# Patient Record
Sex: MALE | Race: BLACK OR AFRICAN AMERICAN | NOT HISPANIC OR LATINO | Employment: FULL TIME | URBAN - METROPOLITAN AREA
[De-identification: names, ages, dates, MRNs, and addresses within clinical notes are randomized per-mention and may not be internally consistent; named-entity substitution may affect disease eponyms.]

---

## 2023-03-01 LAB — PROSTATE SPECIFIC AG (NG/ML) IN SER/PLAS: 0.15 NG/ML (ref 0–4)

## 2023-03-13 DIAGNOSIS — I26.99 PULMONARY EMBOLISM, UNSPECIFIED CHRONICITY, UNSPECIFIED PULMONARY EMBOLISM TYPE, UNSPECIFIED WHETHER ACUTE COR PULMONALE PRESENT (MULTI): ICD-10-CM

## 2023-03-15 RX ORDER — RIVAROXABAN 10 MG/1
TABLET, FILM COATED ORAL
Qty: 90 TABLET | Refills: 3 | Status: SHIPPED
Start: 2023-03-15 | End: 2023-04-03 | Stop reason: SDUPTHER

## 2023-04-03 DIAGNOSIS — I26.99 PULMONARY EMBOLISM, UNSPECIFIED CHRONICITY, UNSPECIFIED PULMONARY EMBOLISM TYPE, UNSPECIFIED WHETHER ACUTE COR PULMONALE PRESENT (MULTI): ICD-10-CM

## 2023-04-07 DIAGNOSIS — E78.2 COMBINED HYPERLIPIDEMIA: ICD-10-CM

## 2023-04-07 DIAGNOSIS — M10.9 ACUTE GOUT, UNSPECIFIED CAUSE, UNSPECIFIED SITE: ICD-10-CM

## 2023-04-07 DIAGNOSIS — E03.9 HYPOTHYROIDISM, UNSPECIFIED TYPE: ICD-10-CM

## 2023-04-07 PROBLEM — H04.123 BILATERAL DRY EYES: Status: ACTIVE | Noted: 2023-04-07

## 2023-04-07 PROBLEM — E29.1 HYPOGONADISM MALE: Status: ACTIVE | Noted: 2023-04-07

## 2023-04-07 PROBLEM — R41.3 MEMORY CHANGE: Status: ACTIVE | Noted: 2023-04-07

## 2023-04-07 PROBLEM — R47.89 WORD FINDING DIFFICULTY: Status: ACTIVE | Noted: 2023-04-07

## 2023-04-07 PROBLEM — M54.50 LOW BACK PAIN: Status: ACTIVE | Noted: 2023-04-07

## 2023-04-07 PROBLEM — N39.0 UTI (URINARY TRACT INFECTION): Status: ACTIVE | Noted: 2023-04-07

## 2023-04-07 PROBLEM — F40.240 CLAUSTROPHOBIA: Status: ACTIVE | Noted: 2023-04-07

## 2023-04-07 PROBLEM — I26.99 PULMONARY EMBOLISM (MULTI): Status: ACTIVE | Noted: 2023-04-07

## 2023-04-07 PROBLEM — H25.13 NUCLEAR SCLEROSIS OF BOTH EYES: Status: ACTIVE | Noted: 2023-04-07

## 2023-04-07 PROBLEM — M51.26 LUMBAR HERNIATED DISC: Status: ACTIVE | Noted: 2023-04-07

## 2023-04-07 PROBLEM — R05.9 COUGH: Status: ACTIVE | Noted: 2023-04-07

## 2023-04-07 PROBLEM — M54.16 LEFT LUMBAR RADICULOPATHY: Status: ACTIVE | Noted: 2023-04-07

## 2023-04-07 PROBLEM — H40.003 GLAUCOMA SUSPECT OF BOTH EYES: Status: ACTIVE | Noted: 2023-04-07

## 2023-04-07 PROBLEM — M54.32 LEFT SIDED SCIATICA: Status: ACTIVE | Noted: 2023-04-07

## 2023-04-07 PROBLEM — M79.89 LEG SWELLING: Status: ACTIVE | Noted: 2023-04-07

## 2023-04-07 PROBLEM — R53.81 MALAISE: Status: ACTIVE | Noted: 2023-04-07

## 2023-04-07 PROBLEM — C61 ADENOCARCINOMA OF PROSTATE (MULTI): Status: ACTIVE | Noted: 2023-04-07

## 2023-04-07 PROBLEM — R39.9 LOWER URINARY TRACT SYMPTOMS (LUTS): Status: ACTIVE | Noted: 2023-04-07

## 2023-04-07 PROBLEM — H04.129 DRY EYE SYNDROME: Status: ACTIVE | Noted: 2023-04-07

## 2023-04-07 PROBLEM — N31.9 NEUROGENIC BLADDER: Status: ACTIVE | Noted: 2023-04-07

## 2023-04-07 PROBLEM — I10 BENIGN ESSENTIAL HYPERTENSION: Status: ACTIVE | Noted: 2023-04-07

## 2023-04-07 PROBLEM — F41.9 ANXIETY: Status: ACTIVE | Noted: 2023-04-07

## 2023-04-07 PROBLEM — N52.9 ED (ERECTILE DYSFUNCTION): Status: ACTIVE | Noted: 2023-04-07

## 2023-04-07 PROBLEM — R31.0 GROSS HEMATURIA: Status: ACTIVE | Noted: 2023-04-07

## 2023-04-07 PROBLEM — C61 PROSTATE CA (MULTI): Status: ACTIVE | Noted: 2023-04-07

## 2023-04-07 PROBLEM — G31.84 MILD COGNITIVE IMPAIRMENT: Status: ACTIVE | Noted: 2023-04-07

## 2023-04-07 PROBLEM — R71.8 HIGH HEMATOCRIT: Status: ACTIVE | Noted: 2023-04-07

## 2023-04-07 PROBLEM — R97.20 ELEVATED PSA: Status: ACTIVE | Noted: 2023-04-07

## 2023-04-07 RX ORDER — PRAVASTATIN SODIUM 40 MG/1
40 TABLET ORAL NIGHTLY
COMMUNITY
Start: 2017-05-31 | End: 2023-04-07 | Stop reason: SDUPTHER

## 2023-04-07 RX ORDER — LEVOTHYROXINE SODIUM 150 UG/1
150 TABLET ORAL DAILY
Qty: 90 TABLET | Refills: 2 | Status: SHIPPED | OUTPATIENT
Start: 2023-04-07 | End: 2023-06-26

## 2023-04-07 RX ORDER — PRAVASTATIN SODIUM 40 MG/1
40 TABLET ORAL NIGHTLY
Qty: 90 TABLET | Refills: 2 | Status: SHIPPED | OUTPATIENT
Start: 2023-04-07 | End: 2023-12-13

## 2023-04-07 RX ORDER — ALLOPURINOL 100 MG/1
100 TABLET ORAL DAILY
Qty: 90 TABLET | Refills: 2 | Status: SHIPPED | OUTPATIENT
Start: 2023-04-07 | End: 2023-12-18

## 2023-04-07 RX ORDER — ALLOPURINOL 100 MG/1
100 TABLET ORAL DAILY
COMMUNITY
Start: 2020-10-13 | End: 2023-04-07 | Stop reason: SDUPTHER

## 2023-04-07 RX ORDER — LEVOTHYROXINE SODIUM 150 UG/1
150 TABLET ORAL DAILY
COMMUNITY
End: 2023-04-07 | Stop reason: SDUPTHER

## 2023-04-20 DIAGNOSIS — E03.9 HYPOTHYROIDISM, UNSPECIFIED TYPE: ICD-10-CM

## 2023-04-20 RX ORDER — LEVOTHYROXINE SODIUM 112 UG/1
112 TABLET ORAL DAILY
COMMUNITY
Start: 2019-11-26 | End: 2023-04-20 | Stop reason: SDUPTHER

## 2023-04-20 RX ORDER — LEVOTHYROXINE SODIUM 112 UG/1
112 TABLET ORAL DAILY
Qty: 90 TABLET | Refills: 1 | Status: SHIPPED | OUTPATIENT
Start: 2023-04-20 | End: 2023-09-19

## 2023-05-22 ENCOUNTER — APPOINTMENT (OUTPATIENT)
Dept: LAB | Facility: LAB | Age: 78
End: 2023-05-22
Payer: COMMERCIAL

## 2023-05-22 LAB — PROSTATE SPECIFIC ANTIGEN,SCREEN: 0.15 NG/ML (ref 0–4)

## 2023-06-12 LAB
ANION GAP IN SER/PLAS: 9 MMOL/L (ref 10–20)
BASOPHILS (10*3/UL) IN BLOOD BY AUTOMATED COUNT: 0.02 X10E9/L (ref 0–0.1)
BASOPHILS/100 LEUKOCYTES IN BLOOD BY AUTOMATED COUNT: 0.5 % (ref 0–2)
CALCIUM (MG/DL) IN SER/PLAS: 9.5 MG/DL (ref 8.6–10.3)
CARBON DIOXIDE, TOTAL (MMOL/L) IN SER/PLAS: 29 MMOL/L (ref 21–32)
CHLORIDE (MMOL/L) IN SER/PLAS: 104 MMOL/L (ref 98–107)
CREATININE (MG/DL) IN SER/PLAS: 0.91 MG/DL (ref 0.5–1.3)
EOSINOPHILS (10*3/UL) IN BLOOD BY AUTOMATED COUNT: 0.1 X10E9/L (ref 0–0.4)
EOSINOPHILS/100 LEUKOCYTES IN BLOOD BY AUTOMATED COUNT: 2.4 % (ref 0–6)
ERYTHROCYTE DISTRIBUTION WIDTH (RATIO) BY AUTOMATED COUNT: 13.2 % (ref 11.5–14.5)
ERYTHROCYTE MEAN CORPUSCULAR HEMOGLOBIN CONCENTRATION (G/DL) BY AUTOMATED: 33.3 G/DL (ref 32–36)
ERYTHROCYTE MEAN CORPUSCULAR VOLUME (FL) BY AUTOMATED COUNT: 93 FL (ref 80–100)
ERYTHROCYTES (10*6/UL) IN BLOOD BY AUTOMATED COUNT: 5.05 X10E12/L (ref 4.5–5.9)
GFR MALE: 86 ML/MIN/1.73M2
GLUCOSE (MG/DL) IN SER/PLAS: 70 MG/DL (ref 74–99)
HEMATOCRIT (%) IN BLOOD BY AUTOMATED COUNT: 46.8 % (ref 41–52)
HEMOGLOBIN (G/DL) IN BLOOD: 15.6 G/DL (ref 13.5–17.5)
IMMATURE GRANULOCYTES/100 LEUKOCYTES IN BLOOD BY AUTOMATED COUNT: 0.2 % (ref 0–0.9)
LEUKOCYTES (10*3/UL) IN BLOOD BY AUTOMATED COUNT: 4.2 X10E9/L (ref 4.4–11.3)
LYMPHOCYTES (10*3/UL) IN BLOOD BY AUTOMATED COUNT: 1.6 X10E9/L (ref 0.8–3)
LYMPHOCYTES/100 LEUKOCYTES IN BLOOD BY AUTOMATED COUNT: 38.4 % (ref 13–44)
MONOCYTES (10*3/UL) IN BLOOD BY AUTOMATED COUNT: 0.47 X10E9/L (ref 0.05–0.8)
MONOCYTES/100 LEUKOCYTES IN BLOOD BY AUTOMATED COUNT: 11.3 % (ref 2–10)
NEUTROPHILS (10*3/UL) IN BLOOD BY AUTOMATED COUNT: 1.97 X10E9/L (ref 1.6–5.5)
NEUTROPHILS/100 LEUKOCYTES IN BLOOD BY AUTOMATED COUNT: 47.2 % (ref 40–80)
PLATELETS (10*3/UL) IN BLOOD AUTOMATED COUNT: 183 X10E9/L (ref 150–450)
POTASSIUM (MMOL/L) IN SER/PLAS: 4 MMOL/L (ref 3.5–5.3)
SODIUM (MMOL/L) IN SER/PLAS: 138 MMOL/L (ref 136–145)
UREA NITROGEN (MG/DL) IN SER/PLAS: 14 MG/DL (ref 6–23)

## 2023-06-13 LAB — URINE CULTURE: NORMAL

## 2023-06-14 LAB — STAPH/MRSA SCREEN, CULTURE: NORMAL

## 2023-06-22 ENCOUNTER — HOSPITAL ENCOUNTER (OUTPATIENT)
Dept: DATA CONVERSION | Facility: HOSPITAL | Age: 78
End: 2023-06-22
Attending: UROLOGY | Admitting: UROLOGY
Payer: COMMERCIAL

## 2023-06-22 DIAGNOSIS — Z85.46 PERSONAL HISTORY OF MALIGNANT NEOPLASM OF PROSTATE: ICD-10-CM

## 2023-06-22 DIAGNOSIS — N52.8 OTHER MALE ERECTILE DYSFUNCTION: ICD-10-CM

## 2023-06-22 DIAGNOSIS — N52.9 MALE ERECTILE DYSFUNCTION, UNSPECIFIED: ICD-10-CM

## 2023-06-22 DIAGNOSIS — E03.9 HYPOTHYROIDISM, UNSPECIFIED: ICD-10-CM

## 2023-06-22 DIAGNOSIS — I10 ESSENTIAL (PRIMARY) HYPERTENSION: ICD-10-CM

## 2023-06-26 ENCOUNTER — OFFICE VISIT (OUTPATIENT)
Dept: PRIMARY CARE | Facility: CLINIC | Age: 78
End: 2023-06-26
Payer: MEDICARE

## 2023-06-26 VITALS
HEART RATE: 66 BPM | SYSTOLIC BLOOD PRESSURE: 120 MMHG | BODY MASS INDEX: 25.7 KG/M2 | WEIGHT: 169 LBS | DIASTOLIC BLOOD PRESSURE: 76 MMHG

## 2023-06-26 DIAGNOSIS — I26.99 PULMONARY EMBOLISM, UNSPECIFIED CHRONICITY, UNSPECIFIED PULMONARY EMBOLISM TYPE, UNSPECIFIED WHETHER ACUTE COR PULMONALE PRESENT (MULTI): ICD-10-CM

## 2023-06-26 DIAGNOSIS — I10 BENIGN ESSENTIAL HYPERTENSION: ICD-10-CM

## 2023-06-26 DIAGNOSIS — E29.1 HYPOGONADISM MALE: ICD-10-CM

## 2023-06-26 DIAGNOSIS — Z12.11 SCREENING FOR COLON CANCER: ICD-10-CM

## 2023-06-26 DIAGNOSIS — E78.2 MIXED HYPERLIPIDEMIA: ICD-10-CM

## 2023-06-26 DIAGNOSIS — F32.A DEPRESSION, UNSPECIFIED DEPRESSION TYPE: ICD-10-CM

## 2023-06-26 DIAGNOSIS — E55.9 VITAMIN D DEFICIENCY: ICD-10-CM

## 2023-06-26 DIAGNOSIS — C61 PROSTATE CA (MULTI): ICD-10-CM

## 2023-06-26 DIAGNOSIS — R53.81 MALAISE: Primary | ICD-10-CM

## 2023-06-26 PROCEDURE — 1170F FXNL STATUS ASSESSED: CPT | Performed by: INTERNAL MEDICINE

## 2023-06-26 PROCEDURE — 1036F TOBACCO NON-USER: CPT | Performed by: INTERNAL MEDICINE

## 2023-06-26 PROCEDURE — 1159F MED LIST DOCD IN RCRD: CPT | Performed by: INTERNAL MEDICINE

## 2023-06-26 PROCEDURE — 99214 OFFICE O/P EST MOD 30 MIN: CPT | Performed by: INTERNAL MEDICINE

## 2023-06-26 PROCEDURE — 1160F RVW MEDS BY RX/DR IN RCRD: CPT | Performed by: INTERNAL MEDICINE

## 2023-06-26 PROCEDURE — 3078F DIAST BP <80 MM HG: CPT | Performed by: INTERNAL MEDICINE

## 2023-06-26 PROCEDURE — G0439 PPPS, SUBSEQ VISIT: HCPCS | Performed by: INTERNAL MEDICINE

## 2023-06-26 PROCEDURE — 3074F SYST BP LT 130 MM HG: CPT | Performed by: INTERNAL MEDICINE

## 2023-06-26 RX ORDER — MIRABEGRON 50 MG/1
50 TABLET, FILM COATED, EXTENDED RELEASE ORAL DAILY
COMMUNITY
Start: 2023-02-20

## 2023-06-26 RX ORDER — TESTOSTERONE 10 MG/.5G
4 GEL, METERED TOPICAL EVERY MORNING
COMMUNITY
Start: 2019-09-25 | End: 2023-11-15 | Stop reason: SDUPTHER

## 2023-06-26 RX ORDER — ESCITALOPRAM OXALATE 5 MG/1
5 TABLET ORAL DAILY
Qty: 30 TABLET | Refills: 2 | Status: SHIPPED | OUTPATIENT
Start: 2023-06-26 | End: 2023-07-21

## 2023-06-26 ASSESSMENT — ACTIVITIES OF DAILY LIVING (ADL)
MANAGING_FINANCES: INDEPENDENT
GROCERY_SHOPPING: INDEPENDENT
BATHING: INDEPENDENT
TAKING_MEDICATION: INDEPENDENT
DRESSING: INDEPENDENT
DOING_HOUSEWORK: INDEPENDENT

## 2023-06-26 ASSESSMENT — ENCOUNTER SYMPTOMS
RECTAL PAIN: 0
FACIAL SWELLING: 0
MYALGIAS: 0
POLYDIPSIA: 0
DIAPHORESIS: 0
EYE REDNESS: 0
APPETITE CHANGE: 0
VOMITING: 0
EYE PAIN: 0
WHEEZING: 0
COLOR CHANGE: 0
VOICE CHANGE: 0
SLEEP DISTURBANCE: 0
CHEST TIGHTNESS: 0
FREQUENCY: 0
NAUSEA: 0
RHINORRHEA: 0
BRUISES/BLEEDS EASILY: 0
FLANK PAIN: 0
ADENOPATHY: 0
ABDOMINAL PAIN: 0
DIARRHEA: 0
NECK STIFFNESS: 0
SINUS PAIN: 0
PALPITATIONS: 0
TROUBLE SWALLOWING: 0
HEADACHES: 0
SINUS PRESSURE: 0
EYE ITCHING: 0
DYSURIA: 0
DIZZINESS: 0
CONSTIPATION: 0
FACIAL ASYMMETRY: 0
STRIDOR: 0
SPEECH DIFFICULTY: 0
ABDOMINAL DISTENTION: 0
ACTIVITY CHANGE: 0
HEMATURIA: 0
EYE DISCHARGE: 0
WOUND: 0
WEAKNESS: 0
FATIGUE: 0
ARTHRALGIAS: 0
DIFFICULTY URINATING: 0
NUMBNESS: 0
SORE THROAT: 0
TREMORS: 0
NECK PAIN: 0
CHOKING: 0
BACK PAIN: 0
LIGHT-HEADEDNESS: 0
COUGH: 0
JOINT SWELLING: 0
PHOTOPHOBIA: 0
SHORTNESS OF BREATH: 0
POLYPHAGIA: 0
ANAL BLEEDING: 0
SEIZURES: 0
BLOOD IN STOOL: 0
CHILLS: 0

## 2023-06-26 ASSESSMENT — PATIENT HEALTH QUESTIONNAIRE - PHQ9
2. FEELING DOWN, DEPRESSED OR HOPELESS: MORE THAN HALF THE DAYS
10. IF YOU CHECKED OFF ANY PROBLEMS, HOW DIFFICULT HAVE THESE PROBLEMS MADE IT FOR YOU TO DO YOUR WORK, TAKE CARE OF THINGS AT HOME, OR GET ALONG WITH OTHER PEOPLE: NOT DIFFICULT AT ALL

## 2023-06-26 NOTE — PROGRESS NOTES
Subjective   Patient ID: Arturo Bethea Octaviano is a 78 y.o. male who presents for Medicare Annual Wellness Visit Subsequent.    Patient presents for wellness exam and follow-up.  He has been compliant with his medications, diet and exercise.  He is status post insertion of  penile prosthesis.  He report continued postoperative pain.  He is complaining of a fluid collection in his left lower abdomen over the last day.  His wife passed away a few months ago he has been complaining of symptoms of depression.  He reports depressed mood and decreased interest.  He is sleeping okay and his appetite is okay.  He reports no suicidal ideation.  He reports no symptoms of anxiety.  He otherwise feels okay.  He denies any headaches, no dizziness, no sinus problems, no chest pain or shortness of breath.  He denies abdominal pain no nausea vomiting or diarrhea.  He reports no new musculoskeletal complaints.         Review of Systems   Constitutional:  Negative for activity change, appetite change, chills, diaphoresis and fatigue.   HENT:  Negative for congestion, dental problem, drooling, ear discharge, ear pain, facial swelling, hearing loss, mouth sores, nosebleeds, postnasal drip, rhinorrhea, sinus pressure, sinus pain, sneezing, sore throat, tinnitus, trouble swallowing and voice change.    Eyes:  Negative for photophobia, pain, discharge, redness, itching and visual disturbance.   Respiratory:  Negative for cough, choking, chest tightness, shortness of breath, wheezing and stridor.    Cardiovascular:  Negative for chest pain, palpitations and leg swelling.   Gastrointestinal:  Negative for abdominal distention, abdominal pain, anal bleeding, blood in stool, constipation, diarrhea, nausea, rectal pain and vomiting.   Endocrine: Negative for cold intolerance, heat intolerance, polydipsia, polyphagia and polyuria.   Genitourinary:  Negative for decreased urine volume, difficulty urinating, dysuria, enuresis, flank pain, frequency,  genital sores, hematuria and urgency.   Musculoskeletal:  Negative for arthralgias, back pain, gait problem, joint swelling, myalgias, neck pain and neck stiffness.   Skin:  Negative for color change, pallor, rash and wound.   Neurological:  Negative for dizziness, tremors, seizures, syncope, facial asymmetry, speech difficulty, weakness, light-headedness, numbness and headaches.   Hematological:  Negative for adenopathy. Does not bruise/bleed easily.   Psychiatric/Behavioral:  Negative for sleep disturbance.        Objective   /76   Pulse 66   Wt 76.7 kg (169 lb)   BMI 25.70 kg/m²     Physical Exam  Constitutional:       Appearance: Normal appearance.   Cardiovascular:      Rate and Rhythm: Normal rate and regular rhythm.      Heart sounds: No murmur heard.     No gallop.   Pulmonary:      Effort: No respiratory distress.      Breath sounds: No wheezing or rales.   Abdominal:      General: There is no distension.      Palpations: There is no mass.      Tenderness: There is no abdominal tenderness. There is no guarding.      Comments: ?  Fluid collection in left lower abdomen   Musculoskeletal:      Right lower leg: No edema.      Left lower leg: No edema.   Neurological:      Mental Status: He is alert.         Assessment/Plan   Diagnoses and all orders for this visit:  Malaise  -     TSH with reflex to Free T4 if abnormal; Future  Pulmonary embolism, unspecified chronicity, unspecified pulmonary embolism type, unspecified whether acute cor pulmonale present (CMS/HCC)-we will continue with anticoagulation  Prostate CA (CMS/HCC)-follow-up with urology  Benign essential hypertension-stable off of medication  -     Uric Acid; Future  -     Urinalysis with Reflex Microscopic; Future  -     Albumin , Urine Random; Future  Vitamin D deficiency  -     Vitamin D, Total; Future  Mixed hyperlipidemia-we will check a fast lipid profile  -     Hepatic function panel; Future  -     Lipid Panel; Future  Hypogonadism  male-we will check testosterone level.  Patient understands the risk of taking testosterone and prostate cancer.  We will discuss with urology  -     Testosterone, total and free; Future  Depression, unspecified depression type-extensive counseling given.  We will start Lexapro.  -     escitalopram (Lexapro) 5 mg tablet; Take 1 tablet (5 mg) by mouth once daily.  Screening for colon cancer  -     Cologuard® colon cancer screening; Future  Health maintenance-immunizations are up-to-date.  He does not want a colonoscopy.  We will send Cologuard.  Gout-we will check uric acid level.  Hypothyroidism-we will check TSH  Status post penile implant insertion-he will follow-up with urology today.

## 2023-06-26 NOTE — PATIENT INSTRUCTIONS
Please take medication as prescribed.  Obtain fasting blood work and urine.  Follow-up in 1 month.

## 2023-06-27 ENCOUNTER — LAB (OUTPATIENT)
Dept: LAB | Facility: LAB | Age: 78
End: 2023-06-27
Payer: COMMERCIAL

## 2023-06-27 DIAGNOSIS — E78.2 MIXED HYPERLIPIDEMIA: ICD-10-CM

## 2023-06-27 DIAGNOSIS — E55.9 VITAMIN D DEFICIENCY: ICD-10-CM

## 2023-06-27 DIAGNOSIS — E29.1 HYPOGONADISM MALE: ICD-10-CM

## 2023-06-27 DIAGNOSIS — R53.81 MALAISE: ICD-10-CM

## 2023-06-27 DIAGNOSIS — I10 BENIGN ESSENTIAL HYPERTENSION: ICD-10-CM

## 2023-06-27 LAB
ALANINE AMINOTRANSFERASE (SGPT) (U/L) IN SER/PLAS: 20 U/L (ref 10–52)
ALBUMIN (G/DL) IN SER/PLAS: 4.2 G/DL (ref 3.4–5)
ALBUMIN (MG/L) IN URINE: 7.4 MG/L
ALBUMIN/CREATININE (UG/MG) IN URINE: 5.8 UG/MG CRT (ref 0–30)
ALKALINE PHOSPHATASE (U/L) IN SER/PLAS: 66 U/L (ref 33–136)
APPEARANCE, URINE: CLEAR
ASPARTATE AMINOTRANSFERASE (SGOT) (U/L) IN SER/PLAS: 36 U/L (ref 9–39)
BILIRUBIN DIRECT (MG/DL) IN SER/PLAS: 0.1 MG/DL (ref 0–0.3)
BILIRUBIN TOTAL (MG/DL) IN SER/PLAS: 0.6 MG/DL (ref 0–1.2)
BILIRUBIN, URINE: NEGATIVE
BLOOD, URINE: NEGATIVE
CALCIDIOL (25 OH VITAMIN D3) (NG/ML) IN SER/PLAS: 33 NG/ML
CHOLESTEROL (MG/DL) IN SER/PLAS: 152 MG/DL (ref 0–199)
CHOLESTEROL IN HDL (MG/DL) IN SER/PLAS: 40.7 MG/DL
CHOLESTEROL/HDL RATIO: 3.7
COLOR, URINE: YELLOW
CREATININE (MG/DL) IN URINE: 127 MG/DL (ref 20–370)
GLUCOSE, URINE: NEGATIVE MG/DL
KETONES, URINE: NEGATIVE MG/DL
LDL: 85 MG/DL (ref 0–99)
LEUKOCYTE ESTERASE, URINE: NEGATIVE
NITRITE, URINE: NEGATIVE
PH, URINE: 7 (ref 5–8)
PROTEIN TOTAL: 7.4 G/DL (ref 6.4–8.2)
PROTEIN, URINE: NEGATIVE MG/DL
SPECIFIC GRAVITY, URINE: 1.02 (ref 1–1.03)
THYROTROPIN (MIU/L) IN SER/PLAS BY DETECTION LIMIT <= 0.05 MIU/L: 3.07 MIU/L (ref 0.44–3.98)
TRIGLYCERIDE (MG/DL) IN SER/PLAS: 134 MG/DL (ref 0–149)
URATE (MG/DL) IN SER/PLAS: 4.6 MG/DL (ref 4–7.5)
UROBILINOGEN, URINE: <2 MG/DL (ref 0–1.9)
VLDL: 27 MG/DL (ref 0–40)

## 2023-06-27 PROCEDURE — 36415 COLL VENOUS BLD VENIPUNCTURE: CPT

## 2023-06-27 PROCEDURE — 82306 VITAMIN D 25 HYDROXY: CPT

## 2023-06-27 PROCEDURE — 81003 URINALYSIS AUTO W/O SCOPE: CPT

## 2023-06-27 PROCEDURE — 82043 UR ALBUMIN QUANTITATIVE: CPT

## 2023-06-27 PROCEDURE — 82570 ASSAY OF URINE CREATININE: CPT

## 2023-06-27 PROCEDURE — 80076 HEPATIC FUNCTION PANEL: CPT

## 2023-06-27 PROCEDURE — 84443 ASSAY THYROID STIM HORMONE: CPT

## 2023-06-27 PROCEDURE — 84550 ASSAY OF BLOOD/URIC ACID: CPT

## 2023-06-27 PROCEDURE — 84403 ASSAY OF TOTAL TESTOSTERONE: CPT

## 2023-06-27 PROCEDURE — 84402 ASSAY OF FREE TESTOSTERONE: CPT

## 2023-06-27 PROCEDURE — 80061 LIPID PANEL: CPT

## 2023-07-03 LAB
TESTOSTERONE FREE (CHAN): 24 PG/ML (ref 30–135)
TESTOSTERONE,TOTAL,LC-MS/MS: 174 NG/DL (ref 250–1100)

## 2023-07-04 DIAGNOSIS — F32.A DEPRESSION, UNSPECIFIED DEPRESSION TYPE: ICD-10-CM

## 2023-07-07 LAB — NONINV COLON CA DNA+OCC BLD SCRN STL QL: NEGATIVE

## 2023-07-18 DIAGNOSIS — F32.A DEPRESSION, UNSPECIFIED DEPRESSION TYPE: ICD-10-CM

## 2023-07-21 RX ORDER — ESCITALOPRAM OXALATE 5 MG/1
TABLET ORAL
Qty: 30 TABLET | Refills: 2 | Status: SHIPPED | OUTPATIENT
Start: 2023-07-21 | End: 2023-07-26 | Stop reason: DRUGHIGH

## 2023-07-26 ENCOUNTER — OFFICE VISIT (OUTPATIENT)
Dept: PRIMARY CARE | Facility: CLINIC | Age: 78
End: 2023-07-26
Payer: COMMERCIAL

## 2023-07-26 VITALS
DIASTOLIC BLOOD PRESSURE: 76 MMHG | HEART RATE: 68 BPM | BODY MASS INDEX: 26.3 KG/M2 | WEIGHT: 173 LBS | SYSTOLIC BLOOD PRESSURE: 120 MMHG

## 2023-07-26 DIAGNOSIS — I10 BENIGN ESSENTIAL HYPERTENSION: ICD-10-CM

## 2023-07-26 DIAGNOSIS — R41.3 MEMORY CHANGE: ICD-10-CM

## 2023-07-26 DIAGNOSIS — E78.2 COMBINED HYPERLIPIDEMIA: ICD-10-CM

## 2023-07-26 DIAGNOSIS — E29.1 HYPOGONADISM MALE: ICD-10-CM

## 2023-07-26 DIAGNOSIS — E03.9 HYPOTHYROIDISM, UNSPECIFIED TYPE: ICD-10-CM

## 2023-07-26 DIAGNOSIS — F32.A DEPRESSION, UNSPECIFIED DEPRESSION TYPE: Primary | ICD-10-CM

## 2023-07-26 PROBLEM — R05.9 COUGH: Status: RESOLVED | Noted: 2023-04-07 | Resolved: 2023-07-26

## 2023-07-26 PROCEDURE — 1159F MED LIST DOCD IN RCRD: CPT | Performed by: INTERNAL MEDICINE

## 2023-07-26 PROCEDURE — 3078F DIAST BP <80 MM HG: CPT | Performed by: INTERNAL MEDICINE

## 2023-07-26 PROCEDURE — 99213 OFFICE O/P EST LOW 20 MIN: CPT | Performed by: INTERNAL MEDICINE

## 2023-07-26 PROCEDURE — 1126F AMNT PAIN NOTED NONE PRSNT: CPT | Performed by: INTERNAL MEDICINE

## 2023-07-26 PROCEDURE — 3074F SYST BP LT 130 MM HG: CPT | Performed by: INTERNAL MEDICINE

## 2023-07-26 PROCEDURE — 1160F RVW MEDS BY RX/DR IN RCRD: CPT | Performed by: INTERNAL MEDICINE

## 2023-07-26 PROCEDURE — 1036F TOBACCO NON-USER: CPT | Performed by: INTERNAL MEDICINE

## 2023-07-26 RX ORDER — ESCITALOPRAM OXALATE 10 MG/1
10 TABLET ORAL DAILY
Qty: 90 TABLET | Refills: 1 | Status: SHIPPED | OUTPATIENT
Start: 2023-07-26 | End: 2023-12-06 | Stop reason: SDUPTHER

## 2023-07-26 ASSESSMENT — ENCOUNTER SYMPTOMS
CHEST TIGHTNESS: 0
BLOOD IN STOOL: 0
NECK STIFFNESS: 0
DIFFICULTY URINATING: 0
RECTAL PAIN: 0
CONSTIPATION: 0
WHEEZING: 0
TREMORS: 0
VOMITING: 0
NUMBNESS: 0
ABDOMINAL PAIN: 0
VOICE CHANGE: 0
FREQUENCY: 0
DIZZINESS: 0
COUGH: 0
BACK PAIN: 0
FATIGUE: 0
APPETITE CHANGE: 0
CHILLS: 0
PHOTOPHOBIA: 0
ANAL BLEEDING: 0
ACTIVITY CHANGE: 0
WEAKNESS: 0
HEMATURIA: 0
SLEEP DISTURBANCE: 0
FACIAL SWELLING: 0
PALPITATIONS: 0
SEIZURES: 0
SPEECH DIFFICULTY: 0
ABDOMINAL DISTENTION: 0
DYSURIA: 0
FACIAL ASYMMETRY: 0
NECK PAIN: 0
RHINORRHEA: 1
WOUND: 0
NAUSEA: 0
JOINT SWELLING: 0
POLYPHAGIA: 0
COLOR CHANGE: 0
TROUBLE SWALLOWING: 0
LIGHT-HEADEDNESS: 0
SINUS PRESSURE: 0
POLYDIPSIA: 0
EYE REDNESS: 0
EYE PAIN: 0
ARTHRALGIAS: 0
EYE ITCHING: 0
SORE THROAT: 0
DIARRHEA: 0
DIAPHORESIS: 0
EYE DISCHARGE: 0
STRIDOR: 0
CHOKING: 0
SHORTNESS OF BREATH: 0
FLANK PAIN: 0
SINUS PAIN: 0
ADENOPATHY: 0
BRUISES/BLEEDS EASILY: 0
HEADACHES: 0
MYALGIAS: 0

## 2023-07-26 ASSESSMENT — PATIENT HEALTH QUESTIONNAIRE - PHQ9
1. LITTLE INTEREST OR PLEASURE IN DOING THINGS: NOT AT ALL
2. FEELING DOWN, DEPRESSED OR HOPELESS: NOT AT ALL
SUM OF ALL RESPONSES TO PHQ9 QUESTIONS 1 AND 2: 0

## 2023-07-26 NOTE — PROGRESS NOTES
Subjective   Patient ID: Arturo Bethea Octaviano is a 78 y.o. male who presents for Follow-up.    Patient presents for follow-up.  He has been compliant with his medications, diet and exercise.  He continues to complain of some depression symptoms.  He is not sleeping well at night.  He reports no suicidal ideations.  He denies any headaches, no dizziness, does complain of allergy symptoms.  He denies any chest pain or shortness of breath, no abdominal pain, no nausea vomiting or diarrhea..  He is complaining of a skin tag around his rectum.  He has some irritation.  She denies any constipation or bleeding.  He reports no new musculoskeletal complaints.         Review of Systems   Constitutional:  Negative for activity change, appetite change, chills, diaphoresis and fatigue.   HENT:  Positive for rhinorrhea. Negative for congestion, dental problem, drooling, ear discharge, ear pain, facial swelling, hearing loss, mouth sores, nosebleeds, postnasal drip, sinus pressure, sinus pain, sneezing, sore throat, tinnitus, trouble swallowing and voice change.    Eyes:  Negative for photophobia, pain, discharge, redness, itching and visual disturbance.   Respiratory:  Negative for cough, choking, chest tightness, shortness of breath, wheezing and stridor.    Cardiovascular:  Negative for chest pain, palpitations and leg swelling.   Gastrointestinal:  Negative for abdominal distention, abdominal pain, anal bleeding, blood in stool, constipation, diarrhea, nausea, rectal pain and vomiting.   Endocrine: Negative for cold intolerance, heat intolerance, polydipsia, polyphagia and polyuria.   Genitourinary:  Negative for decreased urine volume, difficulty urinating, dysuria, enuresis, flank pain, frequency, genital sores, hematuria and urgency.   Musculoskeletal:  Negative for arthralgias, back pain, gait problem, joint swelling, myalgias, neck pain and neck stiffness.   Skin:  Negative for color change, pallor, rash and wound.    Neurological:  Negative for dizziness, tremors, seizures, syncope, facial asymmetry, speech difficulty, weakness, light-headedness, numbness and headaches.   Hematological:  Negative for adenopathy. Does not bruise/bleed easily.   Psychiatric/Behavioral:  Negative for sleep disturbance.        Objective   /76   Pulse 68   Wt 78.5 kg (173 lb)   BMI 26.30 kg/m²     Physical Exam  Constitutional:       Appearance: Normal appearance.   Cardiovascular:      Rate and Rhythm: Normal rate and regular rhythm.      Heart sounds: No murmur heard.     No gallop.   Pulmonary:      Effort: No respiratory distress.      Breath sounds: No wheezing or rales.   Abdominal:      General: There is no distension.      Palpations: There is no mass.      Tenderness: There is no abdominal tenderness. There is no guarding.   Musculoskeletal:      Right lower leg: No edema.      Left lower leg: No edema.   Neurological:      Mental Status: He is alert.     Rectal-?  Skin tag in rectum.    Assessment/Plan   Diagnoses and all orders for this visit:  Depression, unspecified depression type-we will increase Lexapro to 10 mg daily.  Will monitor symptoms.  -     escitalopram (Lexapro) 10 mg tablet; Take 1 tablet (10 mg) by mouth once daily.  Combined hyperlipidemia-we will continue with pravastatin  Benign essential hypertension-stable off of medications.  Hypothyroidism, unspecified type-we will continue with Synthroid  Memory change-he will schedule appointment with neurology at [facility]  Hypogonadism male-we will continue with testosterone replacement.  Risk of worsening prostate cancer explained.  He agrees due to the lack of quality of his life without testosterone.  Follow-up with urology.  Prostate cancer-repeat PSA in a few months.  Health maintenance-immunizations are up-to-date.  Cologuard has been done.  ?  Skin tag-  Rectal-?  Skin tag-we will refer to GI for further evaluation.

## 2023-08-21 RX ORDER — ESCITALOPRAM OXALATE 5 MG/1
5 TABLET ORAL DAILY
Qty: 90 TABLET | Refills: 0 | OUTPATIENT
Start: 2023-08-21 | End: 2023-11-19

## 2023-09-07 VITALS — WEIGHT: 170.86 LBS | BODY MASS INDEX: 25.31 KG/M2 | HEIGHT: 69 IN

## 2023-09-18 DIAGNOSIS — I26.99 PULMONARY EMBOLISM, UNSPECIFIED CHRONICITY, UNSPECIFIED PULMONARY EMBOLISM TYPE, UNSPECIFIED WHETHER ACUTE COR PULMONALE PRESENT (MULTI): ICD-10-CM

## 2023-09-18 RX ORDER — RIVAROXABAN 10 MG/1
10 TABLET, FILM COATED ORAL DAILY
Qty: 90 TABLET | Refills: 3 | OUTPATIENT
Start: 2023-09-18 | End: 2024-02-14

## 2023-09-19 DIAGNOSIS — E03.9 HYPOTHYROIDISM, UNSPECIFIED TYPE: ICD-10-CM

## 2023-09-19 RX ORDER — LEVOTHYROXINE SODIUM 112 UG/1
112 TABLET ORAL DAILY
Qty: 90 TABLET | Refills: 3 | Status: SHIPPED | OUTPATIENT
Start: 2023-09-19 | End: 2024-02-27 | Stop reason: DRUGHIGH

## 2023-09-25 ENCOUNTER — LAB (OUTPATIENT)
Dept: LAB | Facility: LAB | Age: 78
End: 2023-09-25
Payer: COMMERCIAL

## 2023-09-25 LAB — PROSTATE SPECIFIC AG (NG/ML) IN SER/PLAS: 0.15 NG/ML (ref 0–4)

## 2023-09-30 NOTE — H&P
History & Physical Reviewed:   I have reviewed the History and Physical dated:  06-Jun-2023   History and Physical reviewed and relevant findings noted. Patient examined to review pertinent physical  findings.: No significant changes   Home Medications Reviewed: no changes noted   Allergies Reviewed: no changes noted       ERAS (Enhanced Recovery After Surgery):  ·  ERAS Patient: no     Consent:   COVID-19 Consent:  ·  COVID-19 Risk Consent Surgeon has reviewed key risks related to the risk of brandee COVID-19 and if they contract COVID-19 what the risks are.     Attestation:   Note Completion:  I am a:  Resident/Fellow   Attending Attestation I saw and evaluated the patient.  I personally obtained the key and critical portions of the history and physical exam or was physically present for key and  critical portions performed by the resident/fellow. I reviewed the resident/fellow?s documentation and discussed the patient with the resident/fellow.  I agree with the resident/fellow?s medical decision making as documented in the note.     I personally evaluated the patient on 22-Jun-2023         Electronic Signatures:  Lizandro Hall)  (Signed 26-Jun-2023 17:36)   Authored: Note Completion   Co-Signer: History & Physical Reviewed, ERAS, Consent, Note Completion  Luzmaria Leon (Resident))  (Signed 22-Jun-2023 07:18)   Authored: History & Physical Reviewed, ERAS, Consent,  Note Completion      Last Updated: 26-Jun-2023 17:36 by Lizandro Hall)

## 2023-10-02 NOTE — OP NOTE
Post Operative Note:     PreOp Diagnosis: Erectile dysfunction   Post-Procedure Diagnosis: Same   Procedure: 1. Insertion of 3 piece inflatable penile  prosthesis (Coloplast Titan, [place] in LEFT Submuscular space)  2. Penile injection of pharmacologic agent for artificial erection  3. Penile modeling (plastic operation for correction of penile curvature)   Surgeon: Lizandro Hall   Resident/Fellow/Other Assistant: Luzmaria Leon   Estimated Blood Loss (mL): 25 cc   Specimen: no   Complications: None   Findings: Circumcised phallus. Small scrotum. 20  cm with 1 cm rear tips bilaterally. Leftward curvature > 45 degrees noted after penile implant placement, reduced with penile modeling.   Patient Returned To/Condition: PACU/stable   Drains and/or Catheters: SPENCER drain (R groin)   Implants: Coloplast IPP     Operative Report Dictated:  Dictation: not applicable - note contains Operative  Report   Operative Report:    PREOPERATIVE DIAGNOSIS:  Erectile Dysfunction  POSTOPERATIVE DIAGNOSIS:  Erectile Dysfunction  PROCEDURE:  Placement of Coloplast Titan zero degree inflatable penile prosthesis. Penile Modeling     Cylinders:  20 cm     Rear tips: 1 cm bilaterally     Reservoir: 85 mL    Site:  Left submuscular    INDICATIONS:  The patient is a 78 year old male with a history of severe vasculogenic erectile dysfunction refractory to conservative management with PDE 5 inhibitors, VCD, and intracavernosal therapy. Patient decided to undergo definitive surgical management  with inflatable penile prosthesis. The risks (infection, erosion, decrease penile size, damage to other organs, device mechanical failure and need for replacement, etc) and benefits were explained and all questions and concerns were addressed.    PROCEDURE:  The patient was correctly identified in the preoperative holding area and informed consent was obtained and documented.  He was examined in the preoperative area to ensure he had no scrotal or  perineal rashes or skin infections. He received  preoperative antibiotics (vancomycin and gentamicin) IV per pharmacy protocol. He was then brought to the operating room and placed on the table in supine position.  After a universal timeout, SCDs were placed and after adequate induction, general anesthesia  was given. The external genitalia was shaved with clippers making sure not damage the scrotal or penile skin.  A penile block and pudendal blocks were then performed. The patient was then prepped with both Dynahex and Chloroprep and draped in a multilayer  surgical fashion.     A 16 Georgian Booth catheter was placed sterilely on the field and the balloon inflated with 10 cc of sterile water. A Lonestar retractor was brought onto the field. The urethral hook was placed at the 12 o'clock position of the meatus and penis was placed  on stretch. A 4 cm incision was made sharply with the scalpel approximately one fingerbreadth below the penoscrotal junction in a transverse fashion. McCracken were then used with the Lonestar retractor to properly expose the field.      The incision was then brought down to the dartos fascia using Bovie electrocautery layer by layer until the corpora cavernosa were exposed.  2-0 Vicryl holding sutures were placed and corporotomies were made using Bovie electrocautery.  60cc of saline/lidocaine  was injected into the corpora. The corpora were easily dilated with the Nida insertion tool.  The patient's corpora were measured to be 11 cm in the distal direction bilaterally and 10 and 9.5 cm in the distal direction, on the right and left respectively,  and we decided to place 20 cm cylinders with 1 cm rear tips.    Due to the patient's prior prostatectomy, we decided to place the reservoir submuscularly. The submusuclar space was bluntly dissected on the patient's left hand side with the nasal speculum, and the reservoir was placed. The reservoir was then filled  with 85 mL of sterile saline.  No bulging or tubing was palpable. Copious amounts of Irricept used during this dissection and placement.    Once this was complete, the penile prosthesis was prepared off the field and brought onto the field in antibiotic solution. The penile prosthesis cylinders were placed first proximally and then distally using a Asher needle on a Nida passer.        On test inflation a LEFT curvature was noted, approximately 45 degrees. Penile modeling was performed by clamping the shods to the pump. The penis was bent contralateral to the curve, taking great care not put pressure on the fossa. After multiple rounds  of modeling, the curvature was improved.       The holding sutures were then tied down to close the corporotomies. Copious amounts of Irriscept used for irrigation during this part of the procedure.      A Mariah clamp and nasal speculum were used to develop a Dartos pouch in the scrotum, in which the penile prosthesis pump was placed. The pump was placed in the most dependent region of the scrotum and held down.  The tubing was then connected using connectors  from the penile prosthesis kit.  Intervening tissue was closed using 2-0 Vicryl suture.      A SPENCER drain was placed on the right hand side of the groin through a stab incision and placed dependently over the pump.  The Dartos was closed in a running fashion using a 2-0 Vicryl suture after copious amounts of antibiotic irrigation.  The skin was  then reapproximated using 3-0 Monocryl sutures in a simple interrupted fashion. Dermabond was applied. A 2-0 silk drain stitch was used to secure the SPENCER drain. The wound was then cleaned and dried. Scrotal support and scrotal fluffs were placed on the  patient. The Booth catheter was removed.  The patient was then awoken from general anesthesia and seemed to tolerate the procedure quite well. He was transferred to the [facility] and brought back to the PACU in stable condition.      Attestation:   Note Completion:  I  am a: Resident/Fellow   Attending Attestation I was present for the entire procedure          Electronic Signatures:  Lizandro Hall (MD)  (Signed 26-Jun-2023 17:54)   Authored: Post Operative Note, Note Completion   Co-Signer: Post Operative Note, Note Completion  Luzmaria Leon (Resident))  (Signed 22-Jun-2023 11:37)   Authored: Post Operative Note, Note Completion      Last Updated: 26-Jun-2023 17:54 by Lizandro Hall)

## 2023-11-13 ENCOUNTER — APPOINTMENT (OUTPATIENT)
Dept: UROLOGY | Facility: HOSPITAL | Age: 78
End: 2023-11-13
Payer: COMMERCIAL

## 2023-11-15 DIAGNOSIS — E29.1 HYPOGONADISM MALE: Primary | ICD-10-CM

## 2023-11-15 RX ORDER — TESTOSTERONE 10 MG/.5G
4 GEL, METERED TOPICAL EVERY MORNING
Qty: 3 EACH | Refills: 3 | Status: SHIPPED | OUTPATIENT
Start: 2023-11-15

## 2023-11-21 ENCOUNTER — TELEPHONE (OUTPATIENT)
Dept: UROLOGY | Facility: HOSPITAL | Age: 78
End: 2023-11-21
Payer: COMMERCIAL

## 2023-11-21 NOTE — TELEPHONE ENCOUNTER
contacted patientn to inform him Dr. Rocha's office is unable to complete the prior authorization for his testosterone. Per his insurance company, the prior authorization was started by Dr. Gaines and Dr. Gaines's office will need to complete this prior authorization. Per [employer] a new prior authorization is unable to be started while another prior authorization is currently pending. Dr. Rocha's staff is unable to cancel the prior authorization because Dr. Rocha's office did not initiate the prior authorization. Patient is aware and will contact Dr. Gaines's office.

## 2023-12-06 DIAGNOSIS — F32.A DEPRESSION, UNSPECIFIED DEPRESSION TYPE: ICD-10-CM

## 2023-12-06 RX ORDER — ESCITALOPRAM OXALATE 10 MG/1
10 TABLET ORAL DAILY
Qty: 90 TABLET | Refills: 1 | Status: SHIPPED | OUTPATIENT
Start: 2023-12-06 | End: 2024-02-26 | Stop reason: SDUPTHER

## 2023-12-13 DIAGNOSIS — E78.2 COMBINED HYPERLIPIDEMIA: ICD-10-CM

## 2023-12-13 RX ORDER — PRAVASTATIN SODIUM 40 MG/1
40 TABLET ORAL NIGHTLY
Qty: 90 TABLET | Refills: 3 | Status: SHIPPED | OUTPATIENT
Start: 2023-12-13

## 2023-12-18 DIAGNOSIS — M10.9 ACUTE GOUT, UNSPECIFIED CAUSE, UNSPECIFIED SITE: ICD-10-CM

## 2023-12-18 RX ORDER — ALLOPURINOL 100 MG/1
100 TABLET ORAL DAILY
Qty: 90 TABLET | Refills: 3 | Status: SHIPPED | OUTPATIENT
Start: 2023-12-18

## 2023-12-28 ENCOUNTER — TELEMEDICINE (OUTPATIENT)
Dept: PRIMARY CARE | Facility: CLINIC | Age: 78
End: 2023-12-28
Payer: COMMERCIAL

## 2023-12-28 DIAGNOSIS — U07.1 COVID: Primary | ICD-10-CM

## 2023-12-28 PROCEDURE — 99213 OFFICE O/P EST LOW 20 MIN: CPT | Performed by: INTERNAL MEDICINE

## 2023-12-28 ASSESSMENT — ENCOUNTER SYMPTOMS
CONSTIPATION: 0
NECK STIFFNESS: 0
LIGHT-HEADEDNESS: 0
DIFFICULTY URINATING: 0
ACTIVITY CHANGE: 0
ADENOPATHY: 0
SHORTNESS OF BREATH: 0
VOMITING: 0
NAUSEA: 0
CHOKING: 0
FATIGUE: 0
DIZZINESS: 1
EYE REDNESS: 0
POLYPHAGIA: 0
TREMORS: 0
ABDOMINAL DISTENTION: 0
POLYDIPSIA: 0
DIARRHEA: 1
BLOOD IN STOOL: 0
MYALGIAS: 0
FACIAL SWELLING: 0
BRUISES/BLEEDS EASILY: 0
EYE DISCHARGE: 0
DYSURIA: 0
VOICE CHANGE: 0
WEAKNESS: 0
SORE THROAT: 0
TROUBLE SWALLOWING: 0
RHINORRHEA: 1
SINUS PAIN: 0
NECK PAIN: 0
STRIDOR: 0
EYE PAIN: 0
JOINT SWELLING: 0
SPEECH DIFFICULTY: 0
APPETITE CHANGE: 0
DIAPHORESIS: 1
COLOR CHANGE: 0
WOUND: 0
HEADACHES: 0
RECTAL PAIN: 0
PHOTOPHOBIA: 0
CHILLS: 0
FACIAL ASYMMETRY: 0
ABDOMINAL PAIN: 0
HEMATURIA: 0
EYE ITCHING: 0
COUGH: 1
WHEEZING: 0
BACK PAIN: 0
NUMBNESS: 0
SINUS PRESSURE: 0
ARTHRALGIAS: 0
FLANK PAIN: 0
FREQUENCY: 0
PALPITATIONS: 0
SLEEP DISTURBANCE: 0
ANAL BLEEDING: 0
SEIZURES: 0
CHEST TIGHTNESS: 0

## 2023-12-28 NOTE — PROGRESS NOTES
Subjective   Patient ID: Arturo Md Brumfield is a 78 y.o. male who presents for No chief complaint on file..    Patient presents for virtual visit.  He was diagnosed with COVID today.  He has been complaining of a 2-day history of fever, chills, cough, rhinorrhea, sore throat, nausea, diarrhea and mild dizziness.  Reports a mild headache, no chest pain or shortness of breath, no abdominal pain or constipation.  He does complain of bodyaches.         Review of Systems   Constitutional:  Positive for diaphoresis. Negative for activity change, appetite change, chills and fatigue.   HENT:  Positive for congestion and rhinorrhea. Negative for dental problem, drooling, ear discharge, ear pain, facial swelling, hearing loss, mouth sores, nosebleeds, postnasal drip, sinus pressure, sinus pain, sneezing, sore throat, tinnitus, trouble swallowing and voice change.    Eyes:  Negative for photophobia, pain, discharge, redness, itching and visual disturbance.   Respiratory:  Positive for cough. Negative for choking, chest tightness, shortness of breath, wheezing and stridor.    Cardiovascular:  Negative for chest pain, palpitations and leg swelling.   Gastrointestinal:  Positive for diarrhea. Negative for abdominal distention, abdominal pain, anal bleeding, blood in stool, constipation, nausea, rectal pain and vomiting.   Endocrine: Negative for cold intolerance, heat intolerance, polydipsia, polyphagia and polyuria.   Genitourinary:  Negative for decreased urine volume, difficulty urinating, dysuria, enuresis, flank pain, frequency, genital sores, hematuria and urgency.   Musculoskeletal:  Negative for arthralgias, back pain, gait problem, joint swelling, myalgias, neck pain and neck stiffness.   Skin:  Negative for color change, pallor, rash and wound.   Neurological:  Positive for dizziness. Negative for tremors, seizures, syncope, facial asymmetry, speech difficulty, weakness, light-headedness, numbness and headaches.    Hematological:  Negative for adenopathy. Does not bruise/bleed easily.   Psychiatric/Behavioral:  Negative for sleep disturbance.        Objective   There were no vitals taken for this visit.    Physical Exam  Constitutional:       Appearance: Normal appearance.   Pulmonary:      Effort: Pulmonary effort is normal.   Neurological:      Mental Status: He is alert.   Psychiatric:         Mood and Affect: Mood normal.         Behavior: Behavior normal.         Thought Content: Thought content normal.         Judgment: Judgment normal.         Assessment/Plan   Diagnoses and all orders for this visit:  COVID-he will take molnupiravir as prescribed.  He will quarantine.  He will go to the emergency room if symptoms worsen.

## 2024-01-02 ENCOUNTER — OFFICE VISIT (OUTPATIENT)
Dept: UROLOGY | Facility: CLINIC | Age: 79
End: 2024-01-02
Payer: MEDICARE

## 2024-01-02 VITALS — BODY MASS INDEX: 26.34 KG/M2 | HEIGHT: 68 IN

## 2024-01-02 DIAGNOSIS — C61 PROSTATE CANCER (MULTI): ICD-10-CM

## 2024-01-02 DIAGNOSIS — N52.31 ERECTILE DYSFUNCTION AFTER RADICAL PROSTATECTOMY: ICD-10-CM

## 2024-01-02 DIAGNOSIS — E29.1 HYPOGONADISM MALE: Primary | ICD-10-CM

## 2024-01-02 DIAGNOSIS — R32 URINARY INCONTINENCE, UNSPECIFIED TYPE: ICD-10-CM

## 2024-01-02 DIAGNOSIS — Z96.0 S/P INSERTION OF PENILE IMPLANT: ICD-10-CM

## 2024-01-02 PROCEDURE — 1159F MED LIST DOCD IN RCRD: CPT | Performed by: UROLOGY

## 2024-01-02 PROCEDURE — 99213 OFFICE O/P EST LOW 20 MIN: CPT | Performed by: UROLOGY

## 2024-01-02 PROCEDURE — 87086 URINE CULTURE/COLONY COUNT: CPT

## 2024-01-02 PROCEDURE — 1036F TOBACCO NON-USER: CPT | Performed by: UROLOGY

## 2024-01-02 PROCEDURE — 1126F AMNT PAIN NOTED NONE PRSNT: CPT | Performed by: UROLOGY

## 2024-01-02 ASSESSMENT — PAIN SCALES - GENERAL: PAINLEVEL: 0-NO PAIN

## 2024-01-02 NOTE — PROGRESS NOTES
HPI  78 year old male referred by Dr. Rocha. S/P 06/22/23: Insertion of 3 piece inflatable penile prosthesis (Coloplast Titan, [place] in LEFT Submuscular space) 2. Penile injection of pharmacologic agent for artificial erection. 3. Penile modeling (plastic operation for correction of penile curvature).      Last visit 08/08/23:   -Pump inflated and deflated today.   -Healing well.   -Warning signs reviewed.   -given green light for sex  -On testosterone therapy elsewhere  -Patient is aware of risks and awaiting further testing of PSA (rising)  -Discussed stopping T if recurrence is noted.      Today's visit:   #Erectile Dysfunction   -S/P IPP 06/22/23.   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   Patient lost his wife.       Lab Results   Component Value Date    PSA 0.15 09/25/2023     Current Medications:  Current Outpatient Medications   Medication Sig Dispense Refill    allopurinol (Zyloprim) 100 mg tablet TAKE 1 TABLET DAILY 90 tablet 3    escitalopram (Lexapro) 10 mg tablet Take 1 tablet (10 mg) by mouth once daily. 90 tablet 1    levothyroxine (Synthroid, Levoxyl) 112 mcg tablet TAKE 1 TABLET DAILY 90 tablet 3    molnupiravir 200 mg capsule Take 4 capsules (800 mg) by mouth 2 times a day. 40 capsule 0    Myrbetriq 50 mg tablet extended release 24 hr 24 hr tablet Take 1 tablet (50 mg) by mouth once daily.      pravastatin (Pravachol) 40 mg tablet TAKE 1 TABLET DAILY AT BEDTIME 90 tablet 3    testosterone (Fortesta) 10 mg/0.5 gram /actuation gel in metered-dose pump gel Place 4 Pump on the skin once daily in the morning. 3 each 3    Xarelto 10 mg tablet TAKE 1 TABLET DAILY 90 tablet 3     No current facility-administered medications for this visit.        PMH:  No past medical history on  file.    PSH:  Past Surgical History:   Procedure Laterality Date    COLONOSCOPY  11/15/2017    Complete Colonoscopy    OTHER SURGICAL HISTORY  05/31/2017    Atrial Cardioversion    PROSTATECTOMY         FMH:  Family History   Problem Relation Name Age of Onset    Heart attack Mother      Heart attack Father      Alzheimer's disease Sister         SHx:  Social History     Tobacco Use    Smoking status: Never    Smokeless tobacco: Never   Substance Use Topics    Alcohol use: Yes     Alcohol/week: 3.0 standard drinks of alcohol     Types: 3 Shots of liquor per week    Drug use: Never       Allergies:  No Known Allergies    Physical Exam   Implant: tips properly placed, inflates and deflates properly   Pump in place, nonfixed, able to inflate and deflate   No signs of infection erosion or extrusion     Assessment/Plan  #Erectile Dysfunction s/p prostatectomy and refractory to medical management with PDE's (Viagra, Cialis) and intracavernosal therapy. S/P IPP 06/22/23.   -Well healed.   -Warning signs reviewed.   -follow up with me PRN     #Prostate cancer / low T  -On testosterone therapy elsewhere.   -Patient is aware of risks and awaiting further testing of PSA (rising).     #Urinary Leakage  -Uacx/ PVR today  -follow up with Dr. Roldan, unclear if this is stress vs urge      Scribe Attestation  By signing my name below, I, Radha Clarke-Zack , Scribe   attest that this documentation has been prepared under the direction and in the presence of Lizandro Hall MD.

## 2024-01-03 LAB — BACTERIA UR CULT: NORMAL

## 2024-01-08 ENCOUNTER — APPOINTMENT (OUTPATIENT)
Dept: UROLOGY | Facility: HOSPITAL | Age: 79
End: 2024-01-08
Payer: COMMERCIAL

## 2024-02-13 ENCOUNTER — HOSPITAL ENCOUNTER (OUTPATIENT)
Dept: CARDIOLOGY | Facility: HOSPITAL | Age: 79
Discharge: HOME | End: 2024-02-13
Payer: MEDICARE

## 2024-02-13 ENCOUNTER — HOSPITAL ENCOUNTER (EMERGENCY)
Facility: HOSPITAL | Age: 79
Discharge: HOME | End: 2024-02-14
Attending: EMERGENCY MEDICINE
Payer: MEDICARE

## 2024-02-13 ENCOUNTER — APPOINTMENT (OUTPATIENT)
Dept: RADIOLOGY | Facility: HOSPITAL | Age: 79
End: 2024-02-13
Payer: MEDICARE

## 2024-02-13 DIAGNOSIS — I48.0 PAROXYSMAL ATRIAL FIBRILLATION (MULTI): Primary | ICD-10-CM

## 2024-02-13 LAB
ALBUMIN SERPL BCP-MCNC: 3.7 G/DL (ref 3.4–5)
ALP SERPL-CCNC: 51 U/L (ref 33–136)
ALT SERPL W P-5'-P-CCNC: 15 U/L (ref 10–52)
ANION GAP SERPL CALC-SCNC: 10 MMOL/L (ref 10–20)
AST SERPL W P-5'-P-CCNC: 27 U/L (ref 9–39)
BASOPHILS # BLD AUTO: 0.02 X10*3/UL (ref 0–0.1)
BASOPHILS NFR BLD AUTO: 0.4 %
BILIRUB DIRECT SERPL-MCNC: 0 MG/DL (ref 0–0.3)
BILIRUB SERPL-MCNC: 0.6 MG/DL (ref 0–1.2)
BUN SERPL-MCNC: 17 MG/DL (ref 6–23)
CALCIUM SERPL-MCNC: 9.2 MG/DL (ref 8.6–10.3)
CARDIAC TROPONIN I PNL SERPL HS: 4 NG/L (ref 0–20)
CHLORIDE SERPL-SCNC: 105 MMOL/L (ref 98–107)
CO2 SERPL-SCNC: 27 MMOL/L (ref 21–32)
CREAT SERPL-MCNC: 0.84 MG/DL (ref 0.5–1.3)
EGFRCR SERPLBLD CKD-EPI 2021: 89 ML/MIN/1.73M*2
EOSINOPHIL # BLD AUTO: 0.08 X10*3/UL (ref 0–0.4)
EOSINOPHIL NFR BLD AUTO: 1.6 %
ERYTHROCYTE [DISTWIDTH] IN BLOOD BY AUTOMATED COUNT: 13.3 % (ref 11.5–14.5)
GLUCOSE SERPL-MCNC: 103 MG/DL (ref 74–99)
HCT VFR BLD AUTO: 44.3 % (ref 41–52)
HGB BLD-MCNC: 15.2 G/DL (ref 13.5–17.5)
IMM GRANULOCYTES # BLD AUTO: 0.01 X10*3/UL (ref 0–0.5)
IMM GRANULOCYTES NFR BLD AUTO: 0.2 % (ref 0–0.9)
INR PPP: 1.3 (ref 0.9–1.1)
LYMPHOCYTES # BLD AUTO: 1.61 X10*3/UL (ref 0.8–3)
LYMPHOCYTES NFR BLD AUTO: 32.1 %
MAGNESIUM SERPL-MCNC: 1.9 MG/DL (ref 1.6–2.4)
MCH RBC QN AUTO: 31.1 PG (ref 26–34)
MCHC RBC AUTO-ENTMCNC: 34.3 G/DL (ref 32–36)
MCV RBC AUTO: 91 FL (ref 80–100)
MONOCYTES # BLD AUTO: 0.61 X10*3/UL (ref 0.05–0.8)
MONOCYTES NFR BLD AUTO: 12.2 %
NEUTROPHILS # BLD AUTO: 2.68 X10*3/UL (ref 1.6–5.5)
NEUTROPHILS NFR BLD AUTO: 53.5 %
NRBC BLD-RTO: 0 /100 WBCS (ref 0–0)
PLATELET # BLD AUTO: 216 X10*3/UL (ref 150–450)
POTASSIUM SERPL-SCNC: 3.8 MMOL/L (ref 3.5–5.3)
PROT SERPL-MCNC: 6.8 G/DL (ref 6.4–8.2)
PROTHROMBIN TIME: 14.9 SECONDS (ref 9.8–12.8)
RBC # BLD AUTO: 4.89 X10*6/UL (ref 4.5–5.9)
SODIUM SERPL-SCNC: 138 MMOL/L (ref 136–145)
TSH SERPL-ACNC: 0.15 MIU/L (ref 0.44–3.98)
WBC # BLD AUTO: 5 X10*3/UL (ref 4.4–11.3)

## 2024-02-13 PROCEDURE — 84443 ASSAY THYROID STIM HORMONE: CPT | Performed by: EMERGENCY MEDICINE

## 2024-02-13 PROCEDURE — 80053 COMPREHEN METABOLIC PANEL: CPT | Performed by: EMERGENCY MEDICINE

## 2024-02-13 PROCEDURE — 82248 BILIRUBIN DIRECT: CPT | Performed by: EMERGENCY MEDICINE

## 2024-02-13 PROCEDURE — 83735 ASSAY OF MAGNESIUM: CPT | Performed by: EMERGENCY MEDICINE

## 2024-02-13 PROCEDURE — 85610 PROTHROMBIN TIME: CPT | Performed by: EMERGENCY MEDICINE

## 2024-02-13 PROCEDURE — 71045 X-RAY EXAM CHEST 1 VIEW: CPT

## 2024-02-13 PROCEDURE — 99291 CRITICAL CARE FIRST HOUR: CPT | Performed by: EMERGENCY MEDICINE

## 2024-02-13 PROCEDURE — 85025 COMPLETE CBC W/AUTO DIFF WBC: CPT | Performed by: EMERGENCY MEDICINE

## 2024-02-13 PROCEDURE — 36415 COLL VENOUS BLD VENIPUNCTURE: CPT | Performed by: EMERGENCY MEDICINE

## 2024-02-13 PROCEDURE — 84484 ASSAY OF TROPONIN QUANT: CPT | Performed by: EMERGENCY MEDICINE

## 2024-02-13 PROCEDURE — 99285 EMERGENCY DEPT VISIT HI MDM: CPT | Mod: 25

## 2024-02-13 PROCEDURE — 84439 ASSAY OF FREE THYROXINE: CPT | Performed by: EMERGENCY MEDICINE

## 2024-02-13 PROCEDURE — 93005 ELECTROCARDIOGRAM TRACING: CPT

## 2024-02-13 PROCEDURE — 71045 X-RAY EXAM CHEST 1 VIEW: CPT | Performed by: RADIOLOGY

## 2024-02-13 ASSESSMENT — PAIN - FUNCTIONAL ASSESSMENT: PAIN_FUNCTIONAL_ASSESSMENT: 0-10

## 2024-02-13 ASSESSMENT — PAIN DESCRIPTION - LOCATION: LOCATION: CHEST

## 2024-02-13 ASSESSMENT — PAIN SCALES - GENERAL
PAINLEVEL_OUTOF10: 0 - NO PAIN

## 2024-02-13 ASSESSMENT — COLUMBIA-SUICIDE SEVERITY RATING SCALE - C-SSRS
1. IN THE PAST MONTH, HAVE YOU WISHED YOU WERE DEAD OR WISHED YOU COULD GO TO SLEEP AND NOT WAKE UP?: NO
2. HAVE YOU ACTUALLY HAD ANY THOUGHTS OF KILLING YOURSELF?: NO
6. HAVE YOU EVER DONE ANYTHING, STARTED TO DO ANYTHING, OR PREPARED TO DO ANYTHING TO END YOUR LIFE?: NO

## 2024-02-14 VITALS
SYSTOLIC BLOOD PRESSURE: 117 MMHG | BODY MASS INDEX: 26.22 KG/M2 | HEART RATE: 62 BPM | TEMPERATURE: 98.8 F | HEIGHT: 68 IN | OXYGEN SATURATION: 96 % | WEIGHT: 173 LBS | DIASTOLIC BLOOD PRESSURE: 82 MMHG | RESPIRATION RATE: 10 BRPM

## 2024-02-14 PROBLEM — Z96.0 S/P INSERTION OF PENILE IMPLANT: Status: ACTIVE | Noted: 2024-02-14

## 2024-02-14 PROBLEM — R32 URINARY INCONTINENCE: Status: ACTIVE | Noted: 2024-02-14

## 2024-02-14 LAB
CARDIAC TROPONIN I PNL SERPL HS: 5 NG/L (ref 0–20)
Q ONSET: 210 MS
QRS COUNT: 18 BEATS
QRS DURATION: 82 MS
QT INTERVAL: 302 MS
QTC CALCULATION(BAZETT): 401 MS
QTC FREDERICIA: 364 MS
R AXIS: -13 DEGREES
T AXIS: 55 DEGREES
T OFFSET: 361 MS
T4 FREE SERPL-MCNC: 0.91 NG/DL (ref 0.61–1.12)
VENTRICULAR RATE: 106 BPM

## 2024-02-14 RX ORDER — METOPROLOL SUCCINATE 25 MG/1
25 TABLET, EXTENDED RELEASE ORAL DAILY
Qty: 20 TABLET | Refills: 0 | Status: SHIPPED | OUTPATIENT
Start: 2024-02-14 | End: 2024-02-26 | Stop reason: WASHOUT

## 2024-02-14 NOTE — PROGRESS NOTES
UROLOGIC FOLLOW-UP VISIT     PROBLEM LIST:  1. Urinary incontinence, unspecified type        2. Prostate cancer (CMS/HCC)        3. S/P insertion of penile implant        4. Erectile dysfunction after radical prostatectomy        5. 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 Harris. Dr. Brumfield transferred care to me in December 2022. At that time his main complaint was occasional urge incontinence which has worsened since IPP placement. He states that this occurred once or twice every few weeks requiring him to wear a pad. After his last appointment he tried behavioral modifications such as timed voiding however this has been unsuccessful. He stopped taking his myrbetriq last year. PSA 12/7/22 0.11, 3/1/2023 0.15, 9/25/2023 0.15. Patient also endorses continued erectile dysfunction after his prostatectomy and is failed Cialis treatment. He underwent Insertion of 3 piece inflatable penile prosthesis 06/22/23 with Dr. Hall and is being followed for his erection concerns.    He denies any current fevers, chills, nausea, vomiting, unintentional weight loss, increased fatigue or new onset bone pain.     PAST MEDICAL HISTORY:  No past medical history on file.    PAST SURGICAL HISTORY:  Past Surgical History:   Procedure Laterality Date    COLONOSCOPY  11/15/2017    Complete Colonoscopy    OTHER SURGICAL HISTORY  05/31/2017    Atrial Cardioversion    PROSTATECTOMY          ALLERGIES:   No Known Allergies     MEDICATIONS:     Current Outpatient Medications:     allopurinol (Zyloprim) 100 mg tablet, TAKE 1 TABLET DAILY, Disp: 90 tablet, Rfl: 3    escitalopram (Lexapro) 10 mg tablet, Take 1 tablet (10 mg) by mouth once daily., Disp: 90 tablet, Rfl: 1    levothyroxine (Synthroid, Levoxyl) 112 mcg tablet,  TAKE 1 TABLET DAILY, Disp: 90 tablet, Rfl: 3    metoprolol succinate XL (Toprol-XL) 25 mg 24 hr tablet, Take 1 tablet (25 mg) by mouth once daily for 20 days. Do not crush or chew., Disp: 20 tablet, Rfl: 0    molnupiravir 200 mg capsule, Take 4 capsules (800 mg) by mouth 2 times a day., Disp: 40 capsule, Rfl: 0    Myrbetriq 50 mg tablet extended release 24 hr 24 hr tablet, Take 1 tablet (50 mg) by mouth once daily., Disp: , Rfl:     pravastatin (Pravachol) 40 mg tablet, TAKE 1 TABLET DAILY AT BEDTIME, Disp: 90 tablet, Rfl: 3    rivaroxaban (Xarelto) 10 mg tablet, Take 2 tablets (20 mg) by mouth once daily., Disp: 60 tablet, Rfl: 0    testosterone (Fortesta) 10 mg/0.5 gram /actuation gel in metered-dose pump gel, Place 4 Pump on the skin once daily in the morning., Disp: 3 each, Rfl: 3      SOCIAL HISTORY:  Patient  reports that he has never smoked. He has never used smokeless tobacco. He reports current alcohol use of about 3.0 standard drinks of alcohol per week. He reports that he does not use drugs.   Social History     Socioeconomic History    Marital status:      Spouse name: Not on file    Number of children: Not on file    Years of education: Not on file    Highest education level: Not on file   Occupational History    Not on file   Tobacco Use    Smoking status: Never    Smokeless tobacco: Never   Substance and Sexual Activity    Alcohol use: Yes     Alcohol/week: 3.0 standard drinks of alcohol     Types: 3 Shots of liquor per week    Drug use: Never    Sexual activity: Not on file   Other Topics Concern    Not on file   Social History Narrative    Not on file     Social Determinants of Health     Financial Resource Strain: Not on file   Food Insecurity: Not on file   Transportation Needs: Not on file   Physical Activity: Not on file   Stress: Not on file   Social Connections: Not on file   Intimate Partner Violence: Not on file   Housing Stability: Not on file       FAMILY HISTORY:  Family History    Problem Relation Name Age of Onset    Heart attack Mother      Heart attack Father      Alzheimer's disease Sister         REVIEW OF SYSTEMS:   Constitutional: Negative for fever and chills. Denies anorexia, weight loss.  Eyes: Negative for visual disturbance.   Respiratory: Negative for shortness of breath.    Cardiovascular: Negative for chest pain.   Gastrointestinal: Negative for nausea and vomiting.   Genitourinary: See interval history above.  Skin: Negative for rash.   Neurological: Negative for dizziness and numbness.   Psychiatric/Behavioral: Negative for confusion and decreased concentration.     PHYSICAL EXAM:  There were no vitals taken for this visit.  Constitutional: Patient appears well-developed and well-nourished. No distress.    Head: Normocephalic and atraumatic.    Neck: Normal range of motion.    Cardiovascular: Normal rate.    Pulmonary/Chest: Effort normal. No respiratory distress.   Abdominal: soft NTND  Musculoskeletal: Normal range of motion.    Neurological: Alert and oriented to person, place, and time.  Psychiatric: Normal mood and affect. Behavior is normal. Thought content normal.        LABORATORY REVIEW:     Lab Results   Component Value Date    BUN 17 02/13/2024    CREATININE 0.84 02/13/2024    EGFR 89 02/13/2024     02/13/2024    K 3.8 02/13/2024     02/13/2024    CO2 27 02/13/2024    CALCIUM 9.2 02/13/2024      Lab Results   Component Value Date    WBC 5.0 02/13/2024    RBC 4.89 02/13/2024    HGB 15.2 02/13/2024    HCT 44.3 02/13/2024    MCV 91 02/13/2024    MCH 31.1 02/13/2024    MCHC 34.3 02/13/2024    RDW 13.3 02/13/2024     02/13/2024        Lab Results   Component Value Date    PSA 0.15 09/25/2023    PSA 0.15 03/01/2023    PSA 0.11 12/07/2022    PSA <0.10 10/08/2021    PSA <0.10 10/23/2019    PSA <0.10 04/15/2019    PSA <0.10 03/12/2019    PSA 6.9 (H) 10/16/2018         Assessment:      1. Urinary incontinence, unspecified type        2. Prostate cancer  (CMS/[facility])        3. S/P insertion of penile implant        4. Erectile dysfunction after radical prostatectomy        5. Hypogonadism male          Plan:    Reviewed and interpreted patient's PSA trend with him today    Will obtain updated PSA today    Will refill patient's myrbetriq for his urinary sx    Will continue q3m PSA checks     33 minutes total spent on patient's care today; >50% time spent on counseling/coordination of care

## 2024-02-14 NOTE — ED PROVIDER NOTES
HPI   Chief Complaint   Patient presents with    Chest Pain     Patient presents to the ED with c/o palpitations.  They are intermittent episodes.        HPI: []  78-year-old  male with a history of paroxysmal A-fib, remote pulm embolism on Xarelto 10 m daily comes in with palpitations.  He states this afternoon he worked out he develop palpitations.  He denies any chest pain pressure heaviness fever chills or nausea vomit diarrhea cough congestion incontinence seizures syncope or near syncope no dizziness.  No hematemesis melena hematochezia no headache vision changes.  No recent travel or hospitalization.  History remote A-fib in the past.    Past history: Pulm embolism, remote paroxysmal A-fib  Social: Patient denies current tobacco use, alcohol socially denies drug abuse.  REVIEW OF SYSTEMS:    GENERAL.: No weight loss, fatigue, anorexia, insomnia, fever.    EYES: No vision loss, double vision, drainage, eye pain.    ENT: No pharyngitis, dry mouth.    CARDIOPULMONARY: No chest pain, positive for palpitations, syncope, near syncope. No shortness of breath, cough, hemoptysis.    GI: No abdominal pain, change in bowel habits, melena, hematemesis, hematochezia, nausea, vomiting, diarrhea.    : No discharge, dysuria, frequency, urgency, hematuria.    MS: No limb pain, joint pain, joint swelling.    SKIN: No rashes.    PSYCH: No depression, anxiety, suicidality, homicidality.    Review of systems is otherwise negative unless stated above or in history of present illness.  Social history, family history, allergies reviewed.  PHYSICAL EXAM:    GENERAL: Vitals noted, no distress. Alert and oriented  x 3. Non-toxic.      EENT: TMs clear. Posterior oropharynx unremarkable. No meningismus. No LAD.     NECK: Supple. Nontender. No midline tenderness.     CARDIAC: Irregularly irregular rate and rhythm, no murmurs rubs or gallops. No JVD    PULMONARY: Lungs clear bilaterally with good aeration. No wheezes rales or  rhonchi. No respiratory distress.  No tachypnea stridor or retractions able to speak in full sentences    ABDOMEN: Soft, nonsurgical. Nontender. No peritoneal signs. Normoactive bowel sounds. No pulsatile masses.     EXTREMITIES: No peripheral edema. Negative Homans bilaterally, no cords.  2+ bounding pulses well-perfused.    SKIN: No rash. Intact.     NEURO: No focal neurologic deficits, NIH score of 0. Cranial nerves normal as tested from II through XII.     MEDICAL DECISION MAKING:  EKG on interpretation shows atrial fibrillation normal axis rate about 105 with no acute ischemic changes.  CBC with differential chemistry LFTs unremarkable troponin negative delta troponin negative TSH low at 0.15 chest x-ray negative.    Treatment in ED: IV established placed on a cardiac monitor.    ED course: Patient's heart rate came down to the mid 70s on its own without intervention remains normotensive and asymptomatic.    Consult: Discussed with cardiology on-call Dr. Villela and if recommended either he can be discharged home along with his comfortable with the small dose of beta-blocker and up the dose of his Xarelto 20 g daily versus hospitalization for further workup and inpatient basis.    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  No signs of ACS or dissection.  No signs of heart failure congestive heart failure.  Euvolemic clinically.  Patient given option to be hospitalized he will prefer to go home which not unreasonable, I will increase the dose of his Xarelto to 20 mg daily, I will prescribe metoprolol 25 mg daily, patient has a cardiologist to follow-up advised urgent option follow-up with strict return precaution.                          Eyad Coma Scale Score: 15                      Patient History   No past medical history on file.  Past Surgical History:   Procedure Laterality Date    COLONOSCOPY  11/15/2017    Complete Colonoscopy    OTHER SURGICAL HISTORY  05/31/2017    Atrial Cardioversion    PROSTATECTOMY       Family History   Problem Relation Name Age of Onset    Heart attack Mother      Heart attack Father      Alzheimer's disease Sister       Social History     Tobacco Use    Smoking status: Never    Smokeless tobacco: Never   Substance Use Topics    Alcohol use: Yes     Alcohol/week: 3.0 standard drinks of alcohol     Types: 3 Shots of liquor per week    Drug use: Never       Physical Exam   ED Triage Vitals   Temperature Heart Rate Respirations BP   02/13/24 2057 02/13/24 2057 02/13/24 2057 02/13/24 2057   37.1 °C (98.8 °F) 91 16 (!) 129/94      Pulse Ox Temp src Heart Rate Source Patient Position   02/13/24 2057 -- 02/13/24 2309 --   96 %  Monitor       BP Location FiO2 (%)     -- --             Physical Exam    ED Course & [facility]   ED Course as of 02/14/24 0035 Wed Feb 14, 2024   0029 Patient EKG on my interpretation shows atrial fibrillation normal axis rate about 105 with no acute ischemic changes.  CBC with differential chemistries unremarkable troponin negative x 2 TSH is low 0.15 chest x-ray negative.  Patient given option or hospitalization versus being discharged discussed with cardiology called to [facility] patient was to go home we will increase the dose of his Xarelto 20 mg daily, I will start a low-dose of metoprolol 25 mg daily with an outpatient follow-up with return precaution. [MT]      ED Course User Index  [MT] Raul Thomas MD         Diagnoses as of 02/14/24 0035   Paroxysmal atrial fibrillation (CMS/HCC)       Medical Decision Making      Procedure  Critical Care    Performed by: Raul Thomas MD  Authorized by: Raul Thomas MD    Critical care provider statement:     Critical care time (minutes):  45    Critical care time was exclusive of:   Separately billable procedures and treating other patients    Critical care was necessary to treat or prevent imminent or life-threatening deterioration of the following conditions: a fib with rvr.    Critical care was time spent personally by me on the following activities:  Blood draw for specimens, development of treatment plan with patient or surrogate, discussions with consultants, evaluation of patient's response to treatment, examination of patient, review of old charts, re-evaluation of patient's condition, pulse oximetry, ordering and review of radiographic studies, ordering and review of laboratory studies and ordering and performing treatments and interventions    Care discussed with comment:  Brent Thomas MD  02/14/24 0039

## 2024-02-15 ENCOUNTER — OFFICE VISIT (OUTPATIENT)
Dept: CARDIOLOGY | Facility: HOSPITAL | Age: 79
End: 2024-02-15
Payer: MEDICARE

## 2024-02-15 ENCOUNTER — HOSPITAL ENCOUNTER (OUTPATIENT)
Dept: CARDIOLOGY | Facility: HOSPITAL | Age: 79
Discharge: HOME | End: 2024-02-15
Payer: MEDICARE

## 2024-02-15 VITALS
WEIGHT: 175 LBS | OXYGEN SATURATION: 97 % | BODY MASS INDEX: 26.52 KG/M2 | HEIGHT: 68 IN | DIASTOLIC BLOOD PRESSURE: 84 MMHG | HEART RATE: 63 BPM | SYSTOLIC BLOOD PRESSURE: 131 MMHG

## 2024-02-15 DIAGNOSIS — M79.89 LEG SWELLING: ICD-10-CM

## 2024-02-15 DIAGNOSIS — R00.2 PALPITATIONS: Primary | ICD-10-CM

## 2024-02-15 DIAGNOSIS — R00.2 PALPITATIONS: ICD-10-CM

## 2024-02-15 LAB
ATRIAL RATE: 56 BPM
P AXIS: 49 DEGREES
P OFFSET: 198 MS
P ONSET: 145 MS
PR INTERVAL: 164 MS
Q ONSET: 227 MS
QRS COUNT: 9 BEATS
QRS DURATION: 82 MS
QT INTERVAL: 428 MS
QTC CALCULATION(BAZETT): 413 MS
QTC FREDERICIA: 418 MS
R AXIS: -33 DEGREES
T AXIS: 32 DEGREES
T OFFSET: 441 MS
VENTRICULAR RATE: 56 BPM

## 2024-02-15 PROCEDURE — 99204 OFFICE O/P NEW MOD 45 MIN: CPT | Performed by: STUDENT IN AN ORGANIZED HEALTH CARE EDUCATION/TRAINING PROGRAM

## 2024-02-15 PROCEDURE — 3079F DIAST BP 80-89 MM HG: CPT | Performed by: STUDENT IN AN ORGANIZED HEALTH CARE EDUCATION/TRAINING PROGRAM

## 2024-02-15 PROCEDURE — 1126F AMNT PAIN NOTED NONE PRSNT: CPT | Performed by: STUDENT IN AN ORGANIZED HEALTH CARE EDUCATION/TRAINING PROGRAM

## 2024-02-15 PROCEDURE — 93005 ELECTROCARDIOGRAM TRACING: CPT | Mod: 59 | Performed by: STUDENT IN AN ORGANIZED HEALTH CARE EDUCATION/TRAINING PROGRAM

## 2024-02-15 PROCEDURE — 3075F SYST BP GE 130 - 139MM HG: CPT | Performed by: STUDENT IN AN ORGANIZED HEALTH CARE EDUCATION/TRAINING PROGRAM

## 2024-02-15 PROCEDURE — 1036F TOBACCO NON-USER: CPT | Performed by: STUDENT IN AN ORGANIZED HEALTH CARE EDUCATION/TRAINING PROGRAM

## 2024-02-15 PROCEDURE — 93246 EXT ECG>7D<15D RECORDING: CPT

## 2024-02-15 PROCEDURE — 1159F MED LIST DOCD IN RCRD: CPT | Performed by: STUDENT IN AN ORGANIZED HEALTH CARE EDUCATION/TRAINING PROGRAM

## 2024-02-15 PROCEDURE — 93010 ELECTROCARDIOGRAM REPORT: CPT | Performed by: INTERNAL MEDICINE

## 2024-02-15 PROCEDURE — 93248 EXT ECG>7D<15D REV&INTERPJ: CPT | Performed by: INTERNAL MEDICINE

## 2024-02-15 PROCEDURE — 99214 OFFICE O/P EST MOD 30 MIN: CPT | Performed by: STUDENT IN AN ORGANIZED HEALTH CARE EDUCATION/TRAINING PROGRAM

## 2024-02-15 NOTE — PATIENT INSTRUCTIONS
RThank you for coming in today. If you have any questions or concerns, you may call the Heart Failure Office at [phone] option [phone], or [phone].  You may also contact our heart failure nursing team via email on [email or web address].    For quicker results set-up your  [facility] account to receive results and other correspondence directly to your phone.    Please bring all your pills/medications to your Cardiology appointments.    **  - We will place an ambulatory ECG monitor on you today. Please wear this for 2 weeks and mail back as directed    - No new medication changes today    - Please have the following tests done:  1.  Echocardiogram, CALL  Tel [phone]   to schedule this test    - You will be referred to the following teams, CALL  [phone] to schedule your appointments with:  1.  Electrophysiology cardiology     - Please make an appointment to be seen in 4 weeks (VIRTUAL)

## 2024-02-15 NOTE — PROGRESS NOTES
Referring Clinician:.  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  Review of the electronic medical record shows a past medical history significant for Graves disease s/p HAWLEY with hypothyroidism maintained on supplementation therapy, prostatic carcinoma status post prostatectomy, ED s/p IPP, paroxysmal atrial fibrillation s/p DCCV  ~2004 maintained on DOAC, hypogonadism maintained on testosterone therapy, hyperlipidemia, history of bilateral pulmonary embolism and left DVT 2019 (precipitant thought to be prostatectomy surgery), depression, ex smoking history.  Most recent TTE 4/2019 demonstrated normal biventricular systolic function with LVEF 60-65%.  He presented to the emergency room 2/13/2024 with palpitations which spontaneously resolved.  He was prescribed metoprolol succinate and his dose of rivaroxaban was increased to 20 mg once daily prior to discharge.  TFTs done were satisfactory.    Dr. Brumifeld reports that he was in his usual state of health before he developed palpitations.  He does mention that he has done vigorous exercise that daily, but nothing else was out of the ordinary.  He does not use energy drinks and has a double latte once daily.  Modest alcohol use.    Symptomatically he denies chest pain, dyspnea at rest, exertional dyspnea, PND, orthopnea.  He does have chronic leg swelling bilaterally.  He had palpitations at the time when he presented to the emergency room, but does not normally have palpitations/palpitation awareness.  He further denies syncope or presyncope.    He monitors his blood pressure at home and his systolics normally run 110-125 mmHg.    Functionally, exercise capacity is described as unlimited and he does do regular exercise.    He is fully adherent with prescribed medications.     Surgical Hx:  -Prostatectomy    Social Hx:  - Smoking-quit 1988  - ETOH-1 glass of wine a day  - Illicit drugs-never  -  - Lives  w alone and cooks well on his own ith     Family Hx:  Specifically, there is no family history of  CAD, heart failure, ICD, PPM, LVAD, OHT, arrhythmias, CVA, or sudden cardiac death.    Medication reconciliation completed, see below.     Medication Documentation Review Audit       Reviewed by Billie Lujan RN (Registered Nurse) on 02/15/24 at 1056      Medication Order Taking? Sig Documenting Provider Last Dose Status   allopurinol (Zyloprim) 100 mg tablet [id number] Yes TAKE 1 TABLET DAILY Roni Gaines MD Taking Active   escitalopram (Lexapro) 10 mg tablet [id number] Yes Take 1 tablet (10 mg) by mouth once daily. Roni Gaines MD Taking Active   levothyroxine (Synthroid, Levoxyl) 112 mcg tablet [id number] Yes TAKE 1 TABLET DAILY Roni Gaines MD Taking Active   metoprolol succinate XL (Toprol-XL) 25 mg 24 hr tablet [id number] Yes Take 1 tablet (25 mg) by mouth once daily for 20 days. Do not crush or chew. Raul Thomas MD Taking Active   molnupiravir 200 mg capsule [id number] No Take 4 capsules (800 mg) by mouth 2 times a day.   Patient not taking: Reported on 2/15/2024    Roni Gaines MD Not Taking Active   Myrbetriq 50 mg tablet extended release 24 hr 24 hr tablet [id number] Yes Take 1 tablet (50 mg) by mouth once daily. Historical Provider, MD Taking Active   pravastatin (Pravachol) 40 mg tablet [id number] Yes TAKE 1 TABLET DAILY AT BEDTIME Roni Gaines MD Taking Active   rivaroxaban (Xarelto) 10 mg tablet [id number] Yes Take 2 tablets (20 mg) by mouth once daily. Raul Thomas MD Taking Active   testosterone (Fortesta) 10 mg/0.5 gram /actuation gel in metered-dose pump gel [id number] Yes Place 4 Pump on the skin once daily in the morning. Roni Gaines MD Taking Active     Discontinued 02/14/24 0035                     No Known Allergies    Review of Systems   Constitutional: Negative.   Eyes: Negative.    Cardiovascular:  Positive for palpitations.   Respiratory: Negative.     Skin:  "Negative.    Gastrointestinal: Negative.    Genitourinary: Negative.    Neurological: Negative.       Investigations:    The electronic medical record has been reviewed by me for salient history. All cardiovascular imaging and testing available in the electronic medical record, and [employer] has been reviewed. The most recent ECG (2/15/2024)has been reviewed independently by me.     2/15/2024: Sinus bradycardia    Vitals:    02/15/24 1047   BP: 131/84   Pulse: 63   SpO2: 97%   Weight: 79.4 kg (175 lb)   Height: 1.727 m (5' 8\")      On examination:    Very pleasant elderly -American man n no apparent CP or painful distress  Immaculately  groomed   Neck: No JVD or HJR  CVS: HS 1,2.   No added sounds  Resp: CTA bilaterally.    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  Review of the electronic medical record shows a past medical history significant for Graves disease s/p HAWLEY with hypothyroidism maintained on supplementation therapy, prostatic carcinoma status post prostatectomy, ED s/p IPP, paroxysmal atrial fibrillation s/p DCCV  ~2004 maintained on DOAC, hypogonadism maintained on testosterone therapy, hyperlipidemia, history of bilateral pulmonary embolism and left DVT 2019 (precipitant thought to be prostatectomy surgery), depression, ex smoking history.  Most recent TTE 4/2019 demonstrated normal biventricular systolic function with LVEF 60-65%.  He presented to the emergency room 2/13/2024 with palpitations which spontaneously resolved.  He was prescribed metoprolol succinate and his dose of rivaroxaban was increased to 20 mg once daily prior to discharge.  TFTs done were satisfactory.    Designated HC PoA: daughter Marybeth  , date verified 2/15/2024    PLAN:    #Paroxysmal atrial fibrillation    -On ECG today  -TTE to re-assess structural  function  - Ambulatory ECG monitor will be placed " for 1 week asess a fib burden  -He will be continued on beta-blockade, and systemic anticoagulation  -Referral placed to electrophysiology cardiology for more detailed management    This note was transcribed using the Dragon Dictation system. There may be grammatical, punctuation, or verbiage errors that can occur with voice recognition programs.    Judith Knowles MD PhD

## 2024-02-19 ENCOUNTER — OFFICE VISIT (OUTPATIENT)
Dept: UROLOGY | Facility: HOSPITAL | Age: 79
End: 2024-02-19
Payer: MEDICARE

## 2024-02-19 DIAGNOSIS — Z96.0 S/P INSERTION OF PENILE IMPLANT: ICD-10-CM

## 2024-02-19 DIAGNOSIS — C61 PROSTATE CANCER (MULTI): ICD-10-CM

## 2024-02-19 DIAGNOSIS — N52.31 ERECTILE DYSFUNCTION AFTER RADICAL PROSTATECTOMY: ICD-10-CM

## 2024-02-19 DIAGNOSIS — R32 URINARY INCONTINENCE, UNSPECIFIED TYPE: Primary | ICD-10-CM

## 2024-02-19 DIAGNOSIS — E29.1 HYPOGONADISM MALE: ICD-10-CM

## 2024-02-19 PROCEDURE — 1126F AMNT PAIN NOTED NONE PRSNT: CPT | Performed by: STUDENT IN AN ORGANIZED HEALTH CARE EDUCATION/TRAINING PROGRAM

## 2024-02-19 PROCEDURE — 99214 OFFICE O/P EST MOD 30 MIN: CPT | Performed by: STUDENT IN AN ORGANIZED HEALTH CARE EDUCATION/TRAINING PROGRAM

## 2024-02-19 PROCEDURE — 1159F MED LIST DOCD IN RCRD: CPT | Performed by: STUDENT IN AN ORGANIZED HEALTH CARE EDUCATION/TRAINING PROGRAM

## 2024-02-19 PROCEDURE — 1036F TOBACCO NON-USER: CPT | Performed by: STUDENT IN AN ORGANIZED HEALTH CARE EDUCATION/TRAINING PROGRAM

## 2024-02-19 RX ORDER — MIRABEGRON 50 MG/1
50 TABLET, EXTENDED RELEASE ORAL DAILY
Status: DISCONTINUED | OUTPATIENT
Start: 2024-02-19 | End: 2024-05-15

## 2024-02-20 DIAGNOSIS — R32 URINARY INCONTINENCE, UNSPECIFIED TYPE: Primary | ICD-10-CM

## 2024-02-20 RX ORDER — MIRABEGRON 50 MG/1
50 TABLET, EXTENDED RELEASE ORAL DAILY
Qty: 30 TABLET | Refills: 2 | Status: SHIPPED | OUTPATIENT
Start: 2024-02-20 | End: 2024-06-04

## 2024-02-26 ENCOUNTER — LAB (OUTPATIENT)
Dept: LAB | Facility: LAB | Age: 79
End: 2024-02-26
Payer: MEDICARE

## 2024-02-26 ENCOUNTER — OFFICE VISIT (OUTPATIENT)
Dept: PRIMARY CARE | Facility: CLINIC | Age: 79
End: 2024-02-26
Payer: MEDICARE

## 2024-02-26 VITALS
HEART RATE: 52 BPM | DIASTOLIC BLOOD PRESSURE: 76 MMHG | BODY MASS INDEX: 26.55 KG/M2 | SYSTOLIC BLOOD PRESSURE: 130 MMHG | WEIGHT: 174.6 LBS

## 2024-02-26 DIAGNOSIS — H91.8X1 OTHER SPECIFIED HEARING LOSS OF RIGHT EAR, UNSPECIFIED HEARING STATUS ON CONTRALATERAL SIDE: ICD-10-CM

## 2024-02-26 DIAGNOSIS — I10 BENIGN ESSENTIAL HYPERTENSION: ICD-10-CM

## 2024-02-26 DIAGNOSIS — R42 DIZZINESS: ICD-10-CM

## 2024-02-26 DIAGNOSIS — I26.99 PULMONARY EMBOLISM, UNSPECIFIED CHRONICITY, UNSPECIFIED PULMONARY EMBOLISM TYPE, UNSPECIFIED WHETHER ACUTE COR PULMONALE PRESENT (MULTI): ICD-10-CM

## 2024-02-26 DIAGNOSIS — C61 ADENOCARCINOMA OF PROSTATE (MULTI): ICD-10-CM

## 2024-02-26 DIAGNOSIS — C61 PROSTATE CANCER (MULTI): ICD-10-CM

## 2024-02-26 DIAGNOSIS — E03.9 HYPOTHYROIDISM, UNSPECIFIED TYPE: ICD-10-CM

## 2024-02-26 DIAGNOSIS — R42 DIZZINESS: Primary | ICD-10-CM

## 2024-02-26 DIAGNOSIS — E78.2 COMBINED HYPERLIPIDEMIA: ICD-10-CM

## 2024-02-26 DIAGNOSIS — E29.1 HYPOGONADISM MALE: ICD-10-CM

## 2024-02-26 DIAGNOSIS — I48.91 ATRIAL FIBRILLATION, UNSPECIFIED TYPE (MULTI): ICD-10-CM

## 2024-02-26 DIAGNOSIS — F41.9 ANXIETY: ICD-10-CM

## 2024-02-26 DIAGNOSIS — F32.A DEPRESSION, UNSPECIFIED DEPRESSION TYPE: ICD-10-CM

## 2024-02-26 DIAGNOSIS — G31.84 MILD COGNITIVE IMPAIRMENT: ICD-10-CM

## 2024-02-26 LAB
CORTIS SERPL-MCNC: 10.9 UG/DL (ref 2.5–20)
PSA SERPL-MCNC: 0.23 NG/ML
TSH SERPL-ACNC: 0.85 MIU/L (ref 0.44–3.98)

## 2024-02-26 PROCEDURE — 82533 TOTAL CORTISOL: CPT

## 2024-02-26 PROCEDURE — 1159F MED LIST DOCD IN RCRD: CPT | Performed by: INTERNAL MEDICINE

## 2024-02-26 PROCEDURE — 3075F SYST BP GE 130 - 139MM HG: CPT | Performed by: INTERNAL MEDICINE

## 2024-02-26 PROCEDURE — 1170F FXNL STATUS ASSESSED: CPT | Performed by: INTERNAL MEDICINE

## 2024-02-26 PROCEDURE — 3078F DIAST BP <80 MM HG: CPT | Performed by: INTERNAL MEDICINE

## 2024-02-26 PROCEDURE — 1124F ACP DISCUSS-NO DSCNMKR DOCD: CPT | Performed by: INTERNAL MEDICINE

## 2024-02-26 PROCEDURE — 1036F TOBACCO NON-USER: CPT | Performed by: INTERNAL MEDICINE

## 2024-02-26 PROCEDURE — 84402 ASSAY OF FREE TESTOSTERONE: CPT

## 2024-02-26 PROCEDURE — 99214 OFFICE O/P EST MOD 30 MIN: CPT | Performed by: INTERNAL MEDICINE

## 2024-02-26 PROCEDURE — G0439 PPPS, SUBSEQ VISIT: HCPCS | Performed by: INTERNAL MEDICINE

## 2024-02-26 PROCEDURE — 84443 ASSAY THYROID STIM HORMONE: CPT

## 2024-02-26 PROCEDURE — 84153 ASSAY OF PSA TOTAL: CPT

## 2024-02-26 PROCEDURE — 1160F RVW MEDS BY RX/DR IN RCRD: CPT | Performed by: INTERNAL MEDICINE

## 2024-02-26 PROCEDURE — 1126F AMNT PAIN NOTED NONE PRSNT: CPT | Performed by: INTERNAL MEDICINE

## 2024-02-26 PROCEDURE — 36415 COLL VENOUS BLD VENIPUNCTURE: CPT

## 2024-02-26 RX ORDER — ESCITALOPRAM OXALATE 10 MG/1
10 TABLET ORAL DAILY
Qty: 90 TABLET | Refills: 3 | Status: SHIPPED | OUTPATIENT
Start: 2024-02-26 | End: 2025-02-20

## 2024-02-26 ASSESSMENT — ENCOUNTER SYMPTOMS
DIARRHEA: 0
EYE ITCHING: 0
APPETITE CHANGE: 0
SPEECH DIFFICULTY: 0
VOMITING: 0
CONSTIPATION: 0
ABDOMINAL PAIN: 0
SHORTNESS OF BREATH: 0
ACTIVITY CHANGE: 0
DIFFICULTY URINATING: 0
POLYDIPSIA: 0
BLOOD IN STOOL: 0
TREMORS: 0
ABDOMINAL DISTENTION: 0
FATIGUE: 0
NAUSEA: 0
FACIAL SWELLING: 0
POLYPHAGIA: 0
HEADACHES: 0
COLOR CHANGE: 0
ADENOPATHY: 0
BRUISES/BLEEDS EASILY: 0
SINUS PAIN: 0
RHINORRHEA: 0
TROUBLE SWALLOWING: 0
EYE REDNESS: 0
WHEEZING: 0
DIZZINESS: 1
HEMATURIA: 0
VOICE CHANGE: 0
DIAPHORESIS: 0
NECK STIFFNESS: 0
NECK PAIN: 0
CHEST TIGHTNESS: 0
STRIDOR: 0
CHOKING: 0
ARTHRALGIAS: 0
FLANK PAIN: 0
BACK PAIN: 0
WOUND: 0
JOINT SWELLING: 0
FREQUENCY: 0
SEIZURES: 0
CHILLS: 0
SLEEP DISTURBANCE: 0
PHOTOPHOBIA: 0
WEAKNESS: 0
ANAL BLEEDING: 0
DYSURIA: 0
FACIAL ASYMMETRY: 0
SINUS PRESSURE: 0
EYE PAIN: 0
COUGH: 0
EYE DISCHARGE: 0
PALPITATIONS: 0
RECTAL PAIN: 0
NUMBNESS: 0
LIGHT-HEADEDNESS: 0
MYALGIAS: 0
SORE THROAT: 0

## 2024-02-26 ASSESSMENT — ACTIVITIES OF DAILY LIVING (ADL)
TAKING_MEDICATION: INDEPENDENT
DRESSING: INDEPENDENT
BATHING: INDEPENDENT
GROCERY_SHOPPING: INDEPENDENT
DOING_HOUSEWORK: INDEPENDENT
MANAGING_FINANCES: INDEPENDENT

## 2024-02-26 ASSESSMENT — PATIENT HEALTH QUESTIONNAIRE - PHQ9
SUM OF ALL RESPONSES TO PHQ9 QUESTIONS 1 AND 2: 0
2. FEELING DOWN, DEPRESSED OR HOPELESS: NOT AT ALL
1. LITTLE INTEREST OR PLEASURE IN DOING THINGS: NOT AT ALL

## 2024-02-26 ASSESSMENT — PAIN SCALES - GENERAL: PAINLEVEL: 0-NO PAIN

## 2024-02-26 NOTE — PROGRESS NOTES
Subjective   Patient ID: Arturo Bethea Octaviano is a 78 y.o. male who presents for Follow-up (dizziness).    HPI     Review of Systems    Objective   Wt 79.2 kg (174 lb 9.6 oz)   BMI 26.55 kg/m²     Physical Exam    Assessment/Plan

## 2024-02-26 NOTE — PROGRESS NOTES
Subjective   Patient ID: Arturo Bethea Octaviano is a 78 y.o. male who presents for Follow-up (dizziness) and Medicare Annual Wellness Visit Subsequent.    Patient presents for wellness exam and follow-up.  He was seen in the emergency room with atrial fibrillation.  He was started on metoprolol and sent home.  He is being evaluated by cardiology.  He is now in normal sinus rhythm.  He is currently wearing a monitor.  He stopped metoprolol.  He has been complaining of occasional dizziness upon standing over the past few months.  He denies any headaches, no dizziness, no chest pain or palpitations.  He denies abdominal pain no nausea vomiting or diarrhea.  He reports no pain.  His incontinence symptoms are improved.  His anxiety and depression symptoms have been stable         Review of Systems   Constitutional:  Negative for activity change, appetite change, chills, diaphoresis and fatigue.   HENT:  Negative for congestion, dental problem, drooling, ear discharge, ear pain, facial swelling, hearing loss, mouth sores, nosebleeds, postnasal drip, rhinorrhea, sinus pressure, sinus pain, sneezing, sore throat, tinnitus, trouble swallowing and voice change.    Eyes:  Negative for photophobia, pain, discharge, redness, itching and visual disturbance.   Respiratory:  Negative for cough, choking, chest tightness, shortness of breath, wheezing and stridor.    Cardiovascular:  Negative for chest pain, palpitations and leg swelling.   Gastrointestinal:  Negative for abdominal distention, abdominal pain, anal bleeding, blood in stool, constipation, diarrhea, nausea, rectal pain and vomiting.   Endocrine: Negative for cold intolerance, heat intolerance, polydipsia, polyphagia and polyuria.   Genitourinary:  Negative for decreased urine volume, difficulty urinating, dysuria, enuresis, flank pain, frequency, genital sores, hematuria and urgency.   Musculoskeletal:  Negative for arthralgias, back pain, gait problem, joint swelling,  myalgias, neck pain and neck stiffness.   Skin:  Negative for color change, pallor, rash and wound.   Neurological:  Positive for dizziness. Negative for tremors, seizures, syncope, facial asymmetry, speech difficulty, weakness, light-headedness, numbness and headaches.   Hematological:  Negative for adenopathy. Does not bruise/bleed easily.   Psychiatric/Behavioral:  Negative for sleep disturbance.        Objective   /76   Pulse 52   Wt 79.2 kg (174 lb 9.6 oz)   BMI 26.55 kg/m² /76 with a pulse of 50 standing.    Physical Exam  Constitutional:       Appearance: Normal appearance.   Cardiovascular:      Rate and Rhythm: Normal rate and regular rhythm.      Heart sounds: No murmur heard.     No gallop.   Pulmonary:      Effort: No respiratory distress.      Breath sounds: No wheezing or rales.   Abdominal:      General: There is no distension.      Palpations: There is no mass.      Tenderness: There is no abdominal tenderness. There is no guarding.   Musculoskeletal:      Right lower leg: No edema.      Left lower leg: No edema.   Neurological:      Mental Status: He is alert.         Assessment/Plan   Diagnoses and all orders for this visit:  Dizziness patient instructed drink increased fluids.  He will call if not better  -     Cortisol; Future  Pulmonary embolism, unspecified chronicity, unspecified pulmonary embolism type, unspecified whether acute cor pulmonale present (CMS/HCC)-we will continue with anticoagulation  Depression, unspecified depression type-stable on present medication  -     escitalopram (Lexapro) 10 mg tablet; Take 1 tablet (10 mg) by mouth once daily.  Mild cognitive impairment-he has appointment with neurology at [facility] next month  Hypogonadism male-check testosterone level.  Risk of being on testosterone and prostate cancer explained  -     Testosterone, total and free; Future  -     TSH with reflex to Free T4 if abnormal; Future  Atrial fibrillation, unspecified  type (CMS/HCC)-rate is controlled.  Will continue with anticoagulation  Benign essential hypertension-stable off medication  Combined hyperlipidemia-we will continue with pravastatin  Hypothyroidism, unspecified type-recheck TSH  Adenocarcinoma of prostate (CMS/HCC)-PSA to be drawn today.  Anxiety-we will continue with Lexapro  Other orders  -     Referral to Primary Care  Dizziness-he will call if not better.  Health maintenance-Cologuard has been done.  Immunizations are up-to-date.  Eye appointment is pending.  Dental appointment has been done.  Advance care planning discussed.

## 2024-02-27 ENCOUNTER — HOSPITAL ENCOUNTER (OUTPATIENT)
Dept: CARDIOLOGY | Facility: HOSPITAL | Age: 79
Discharge: HOME | End: 2024-02-27
Payer: MEDICARE

## 2024-02-27 DIAGNOSIS — E03.9 HYPOTHYROIDISM, UNSPECIFIED TYPE: Primary | ICD-10-CM

## 2024-02-27 DIAGNOSIS — M79.89 LEG SWELLING: ICD-10-CM

## 2024-02-27 DIAGNOSIS — R00.2 PALPITATIONS: ICD-10-CM

## 2024-02-27 PROCEDURE — 93306 TTE W/DOPPLER COMPLETE: CPT

## 2024-02-27 PROCEDURE — 93306 TTE W/DOPPLER COMPLETE: CPT | Performed by: INTERNAL MEDICINE

## 2024-02-27 RX ORDER — LEVOTHYROXINE SODIUM 100 UG/1
100 TABLET ORAL DAILY
Qty: 90 TABLET | Refills: 3 | Status: SHIPPED | OUTPATIENT
Start: 2024-02-27 | End: 2025-02-26

## 2024-02-29 LAB
AORTIC VALVE MEAN GRADIENT: 4.4 MMHG
AORTIC VALVE PEAK VELOCITY: 1.57 M/S
AV PEAK GRADIENT: 9.8 MMHG
AVA (PEAK VEL): 2.05 CM2
AVA (VTI): 2.3 CM2
EJECTION FRACTION APICAL 4 CHAMBER: 62.6
EJECTION FRACTION: 65 %
LEFT ATRIUM VOLUME AREA LENGTH INDEX BSA: 29 ML/M2
LEFT VENTRICLE INTERNAL DIMENSION DIASTOLE: 3.77 CM (ref 3.5–6)
LEFT VENTRICULAR OUTFLOW TRACT DIAMETER: 1.89 CM
MITRAL VALVE E/A RATIO: 0.97
MITRAL VALVE E/E' RATIO: 18.79
RIGHT VENTRICLE FREE WALL PEAK S': 0.08 CM/S
RIGHT VENTRICLE PEAK SYSTOLIC PRESSURE: 30.9 MMHG
TRICUSPID ANNULAR PLANE SYSTOLIC EXCURSION: 2.1 CM

## 2024-03-02 LAB
TESTOSTERONE FREE (CHAN): 42.9 PG/ML (ref 30–135)
TESTOSTERONE,TOTAL,LC-MS/MS: 343 NG/DL (ref 250–1100)

## 2024-03-07 ENCOUNTER — CLINICAL SUPPORT (OUTPATIENT)
Dept: AUDIOLOGY | Facility: CLINIC | Age: 79
End: 2024-03-07
Payer: MEDICARE

## 2024-03-07 DIAGNOSIS — H90.3 BILATERAL SENSORINEURAL HEARING LOSS: Primary | ICD-10-CM

## 2024-03-07 PROCEDURE — 92557 COMPREHENSIVE HEARING TEST: CPT

## 2024-03-07 PROCEDURE — 92550 TYMPANOMETRY & REFLEX THRESH: CPT

## 2024-03-07 ASSESSMENT — PAIN - FUNCTIONAL ASSESSMENT: PAIN_FUNCTIONAL_ASSESSMENT: 0-10

## 2024-03-07 ASSESSMENT — PAIN SCALES - GENERAL: PAINLEVEL_OUTOF10: 0 - NO PAIN

## 2024-03-07 NOTE — PROGRESS NOTES
AUDIOLOGIC EVALUATION      Name:  Arturo Brumfield  :  1945  Age:  78 y.o.  Date of Evaluation:  3/7/2024    Time: 1093-6597    HISTORY  Arturo Brumfield, 78 y.o., was seen for an audiologic assessment. He reported a gradual decline in hearing sensitivity, bilaterally. He noted that he has been missing words and that music does not sound as good as it used to. He has a history of excessive recreational noise exposure. He denies otalgia, otorrhea, tinnitus, dizziness, aural pressure/fullness, history of otologic surgeries, and family history of hearing loss.      RESULTS:  Otoscopic Evaluation:  Right Ear: Unremarkable  Left Ear: Unremarkable    Immittance Measures:  Right Ear: Type A -- indicating normal volume, pressure, and static compliance  Left Ear: Type A -- indicating normal volume, pressure, and static compliance    Acoustic Reflexes:  Right Ear: Thresholds present at expected levels for 500-2000 Hz, absent 4000 Hz.  Left Ear: Thresholds present at expected levels for 500-2000 Hz, absent 4000 Hz.    Pure Tone Audiometry:    Right Ear: Hearing within normal limits sloping to a moderately-severe sensorineural hearing loss  Left Ear: Hearing within normal limits sloping to a moderately-severe sensorineural hearing loss  Asymmetry: No    No previous audiogram to compare today's results to.     Reliability:   Good    Speech Audiometry:   Right: Speech Reception Threshold (SRT) was obtained at 25 dBHL.   SRT/PTA in Good agreement with pure tone average.    Left: Speech Reception Threshold (SRT) was obtained at 25 dBHL.   SRT/PTA in Good agreement with pure tone average.    Word Recognition Scores (WRS):  Right Ear: excellent (100%) in quiet when words were presented at 70 dBHL w/ masking.   Left Ear: excellent (100%) in quiet when words were presented at 70 dBHL w/ masking.     Asymmetry: No      IMPRESSIONS:  Today's testing revealed type A tympanograms (indicating normal volume, pressure, and static  compliance), bilaterally. He has hearing within normal limits sloping to a moderately-severe sensorineural hearing loss, bilaterally. He is a candidate for binaural hearing aids. The results were discussed with the patient. He should follow-up for a hearing aid consultation. 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       RECOMMENDATIONS:  1.) Return for audiologic evaluation annually, or sooner should concerns arise.  2.) Follow-up for hearing aid consultation on 3/26/24 at 1pm with Dion Berger CCC-A.      Dion Berger CCC-MARCO ANTONIO  Clinical Audiologist          KEY  SNHL Sensorineural Hearing Loss   CHL Conductive Hearing Loss   MHL Mixed Hearing Loss   SSNHL Sudden Sensorineural Hearing Loss   WNL Within Normal Limits   PTA Pure Tone Average   TM Tympanic Membrane   ECV Ear Canal Volume   SRT Speech Reception Threshold   WRS Word Recognition Score

## 2024-03-08 LAB — BODY SURFACE AREA: 1.95 M2

## 2024-03-20 ENCOUNTER — APPOINTMENT (OUTPATIENT)
Dept: CARDIOLOGY | Facility: HOSPITAL | Age: 79
End: 2024-03-20
Payer: MEDICARE

## 2024-03-26 ENCOUNTER — CLINICAL SUPPORT (OUTPATIENT)
Dept: AUDIOLOGY | Facility: CLINIC | Age: 79
End: 2024-03-26

## 2024-03-26 DIAGNOSIS — H90.3 BILATERAL SENSORINEURAL HEARING LOSS: Primary | ICD-10-CM

## 2024-03-26 PROCEDURE — 92700 UNLISTED ORL SERVICE/PX: CPT

## 2024-03-26 ASSESSMENT — PAIN SCALES - GENERAL: PAINLEVEL_OUTOF10: 0 - NO PAIN

## 2024-03-26 ASSESSMENT — PAIN - FUNCTIONAL ASSESSMENT: PAIN_FUNCTIONAL_ASSESSMENT: 0-10

## 2024-03-27 ENCOUNTER — OFFICE VISIT (OUTPATIENT)
Dept: CARDIOLOGY | Facility: CLINIC | Age: 79
End: 2024-03-27
Payer: MEDICARE

## 2024-03-27 VITALS
OXYGEN SATURATION: 98 % | HEART RATE: 53 BPM | BODY MASS INDEX: 27.59 KG/M2 | DIASTOLIC BLOOD PRESSURE: 77 MMHG | SYSTOLIC BLOOD PRESSURE: 127 MMHG | WEIGHT: 181.44 LBS

## 2024-03-27 DIAGNOSIS — I48.0 PAROXYSMAL ATRIAL FIBRILLATION (MULTI): ICD-10-CM

## 2024-03-27 PROCEDURE — 3074F SYST BP LT 130 MM HG: CPT | Performed by: INTERNAL MEDICINE

## 2024-03-27 PROCEDURE — 93010 ELECTROCARDIOGRAM REPORT: CPT | Performed by: INTERNAL MEDICINE

## 2024-03-27 PROCEDURE — 93005 ELECTROCARDIOGRAM TRACING: CPT | Performed by: INTERNAL MEDICINE

## 2024-03-27 PROCEDURE — 1160F RVW MEDS BY RX/DR IN RCRD: CPT | Performed by: INTERNAL MEDICINE

## 2024-03-27 PROCEDURE — 99214 OFFICE O/P EST MOD 30 MIN: CPT | Performed by: INTERNAL MEDICINE

## 2024-03-27 PROCEDURE — 3078F DIAST BP <80 MM HG: CPT | Performed by: INTERNAL MEDICINE

## 2024-03-27 PROCEDURE — 1159F MED LIST DOCD IN RCRD: CPT | Performed by: INTERNAL MEDICINE

## 2024-03-27 RX ORDER — CYCLOSPORINE 0.5 MG/ML
1 EMULSION OPHTHALMIC EVERY 12 HOURS
COMMUNITY
Start: 2016-03-22 | End: 2024-06-10

## 2024-03-27 RX ORDER — VITAMIN E 268 MG
400 CAPSULE ORAL
COMMUNITY

## 2024-03-27 RX ORDER — MULTIVITAMIN WITH IRON
1 TABLET ORAL DAILY
COMMUNITY

## 2024-03-27 ASSESSMENT — CHA2DS2 SCORE
HYPERTENSION: YES
DIABETES: NO
AGE IN YEARS: 75+
CHF OR LEFT VENTRICULAR DYSFUNCTION: NO
VASCULAR DISEASE: NO
SEX: MALE
PRIOR STROKE OR TIA OR THROMBOEMBOLISM: NO
CHA2D2S VASC SCORE: 3

## 2024-03-27 NOTE — PATIENT INSTRUCTIONS
It was nice to see you today!  You were seen for atrial fibrillation.  We discussed that your risk of stroke in atrial fibrillation is considered elevated given HTN, age > 75 yr and HLD.  Please continue the Xarelto 20 mg daily for stroke risk reduction.  Keep alcohol below 8 drinks/ week to reduce bleeding risk.  Keep up your active and healthy lifestyle.   You can take a metoprolol tartrate 25 mg if needed for rate control. If the episode does not stop you can let Dr Dent know and we can plan an elective cardioversion if needed. [phone].    Return in 1 year.

## 2024-03-27 NOTE — PROGRESS NOTES
AUDIOLOGY HEARING AID EVALUATION      Name:  Arturo Brumfield MD  :  1945  Age:  78 y.o.  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 A previous hearing evaluation on 3/7/2024 indicated hearing within normal limits sloping to a moderately-severe sensorineural hearing loss in both ears, with excellent word recognition bilaterally. Dr. Brumfield indicated he is interested in hearing aids.       IMPRESSIONS AND RECOMMENDATIONS:   The hearing test from 3/7/2024 was reviewed with the patient. They are a hearing aid candidate in both ears. The benefits and drawbacks of different hearing aid styles and levels of technology were reviewed. The patient decided to purchase two rechargeable -in-the-Ear (RITE) style hearing aids in the color H0, with the advanced (70) level technology, with 1M  and medium vented domes for both ears. Pricing and policies were reviewed. Basic hearing aid use and parts were discussed.    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 Payment will be completed at time of fitting. A hearing aid fitting is scheduled for 2024 with Dion Berger CCC-MARCO ANTONIO.    TREATMENT PLAN    1.) Return for scheduled hearing aid fitting.  2.) Schedule an appointment for follow-up every year. Call [phone] and request an Audio-Hearing Aid Check Combo appointment.      DION Parr CCC-A   Clinical Audiologist

## 2024-03-27 NOTE — PROGRESS NOTES
Referred by Dr. Soriano ref. provider found  for    Chief Complaint   Patient presents with    Atrial Fibrillation     Patient referred by Dr. ANDRE Knowles for treatment of atrial fibrillation. Patient does appreciate intermittent palpitations; most recent about a week ago, lasting approximately 20 minutes with heart rate 100-110 beats per minute. He denies lightheadedness, syncope, dyspnea, orthopnea and chest pain/discomfort. 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  He first had onset of atrial fibrillation in 2004. At that time it was felt to be due in part to abnormal thyroid function. He did require cardioversion.  He then did well with out symptomatic recurrence until February 2024 when he experienced onset of palpitations.  He was seen in the ED and ECG documented atrial fibrillation with ventriclar rate of 106 bpm. He denies any shortness of breath, or lightheadedness.   He has noted other episodes the most recent about one week ago which lasted about 20 minutes. He can tell because the rates are 100-110 bpm. Fortunately these episodes have all been self limited.  He had been on Xarelto for DVT/PE and was being maintained on 10 mg daily.  After ED visit in February - He has continued xarelto 20 mg daily for stroke risk reduction in AF.        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 and decreased to 10 mg for maintenance  HLD   H/o Grave's on thyroid replacement therapy  Ca: prostate    Past Surgical History:  Past Surgical History:   Procedure Laterality Date    COLONOSCOPY  11/15/2017    Complete Colonoscopy    OTHER SURGICAL HISTORY  05/31/2017    Atrial Cardioversion    PROSTATECTOMY            Social History:  Caffeine: 2 cup/day  Cigarettes: former quit 1988  ETOH: 1-2 / day  Drugs: none  Exercise: 1 hour qod  Occ: professor  Marital status: w  "    Family History:  Family History   Problem Relation Name Age of Onset    Heart attack Mother      Heart attack Father      Alzheimer's disease Sister           Allergies:  No Known Allergies     Outpatient Medications:  Current Outpatient Medications   Medication Instructions    allopurinol (ZYLOPRIM) 100 mg, oral, Daily    alpha tocopherol (VITAMIN E) 400 Units, oral, Daily RT    cycloSPORINE (Restasis) 0.05 % ophthalmic emulsion 1 drop, Both Eyes, Every 12 hours    escitalopram (LEXAPRO) 10 mg, oral, Daily    levothyroxine (SYNTHROID, LEVOXYL) 100 mcg, oral, Daily    mirabegron (MYRBETRIQ) 50 mg, oral, Daily    multivitamin (Multiple Vitamins) tablet 1 tablet, oral, Daily    Myrbetriq 50 mg, oral, Daily    pravastatin (PRAVACHOL) 40 mg, oral, Nightly    rivaroxaban (XARELTO) 20 mg, oral, Daily    testosterone (Fortesta) 10 mg/0.5 gram /actuation gel in metered-dose pump gel 4 Pump, transdermal, Every morning         Last Recorded Vitals:      2/13/2024     8:57 PM 2/13/2024    11:09 PM 2/13/2024    11:30 PM 2/14/2024    12:30 AM 2/15/2024    10:47 AM 2/26/2024    10:25 AM 3/27/2024    10:14 AM   Vitals   Systolic 129 92 104 117 131 130 127   Diastolic 94 59 84 82 84 76 77   Heart Rate 91 70 87 62 63 52 53   Temp 37.1 °C (98.8 °F)         Resp 16 13 11 10      Height (in) 1.727 m (5' 8\")    1.727 m (5' 8\")     Weight (lb) 173    175 174.6 181.44   BMI 26.3 kg/m2    26.61 kg/m2 26.55 kg/m2 27.59 kg/m2   BSA (m2) 1.94 m2    1.95 m2 1.95 m2 1.99 m2   Visit Report     Report Report Report        Physical Exam:  Physical Exam  Constitutional:       Appearance: Normal appearance.   Neck:      Vascular: No carotid bruit.   Cardiovascular:      Rate and Rhythm: Normal rate and regular rhythm.      Chest Wall: PMI is not displaced.      Pulses: Normal pulses.      Heart sounds: S1 normal and S2 normal. No murmur heard.     No friction rub. No gallop. No S3 or S4 sounds.   Pulmonary:      Effort: Pulmonary effort is " normal. No respiratory distress.      Breath sounds: Normal breath sounds. No wheezing, rhonchi or rales.   Musculoskeletal:      Right lower leg: No edema.      Left lower leg: No edema.   Skin:     General: Skin is warm and dry.   Neurological:      General: No focal deficit present.      Mental Status: He is alert and oriented to person, place, and time.   Psychiatric:         Mood and Affect: Mood normal.         Judgment: Judgment normal.         Last Cardiology Tests:  ECG:    ECG today: NSR 53 bpm LAD     ECG 12 lead (Clinic Performed) 02/15/2024  : NSR      ECG 12 lead 02/13/2024      Echo:  Transthoracic echo (TTE) complete 02/27/2024    [facility], [address] [place] [address]               Tel [phone] and Fax [phone]     TRANSTHORACIC ECHOCARDIOGRAM REPORT        Patient Name:     DR. CHEPE FUENTES       Reading           [id number] [address]ILA               Physician:  Study Date:       2/27/2024            Ordering          [id number] [address]LOS GONZALEZ                                         Provider:         BRIAN  MRN/PID:          [id number]             Fellow:  Accession#:       [id number]         Nurse:  Date of           1945 / 78 years Sonographer:      Rashmi Vega [facility]  Birth/Age:  Gender:           M                    Additional Staff:  Height:           172.00 cm            Admit Date:       2/27/2024  Weight:           79.00 kg             Admission Status: Outpatient  BSA / BMI:        1.92 m2 / 26.70      Encounter#:       [id number]                    kg/m2  Blood Pressure: 130 /76 mmHg     Study Type:    TRANSTHORACIC ECHO (TTE) COMPLETE  Diagnosis/ICD: Palpitations-R00.2  Indication:    palpitations  CPT Code:      Echo Complete w Full Doppler-[id number]     Patient History:  Pertinent History: HTN, Hyperlipidemia, LE Edema, PE and Chest Pain.     Study Detail: The following Echo studies were performed: 2D, M-Mode, Doppler and                 color flow.        PHYSICIAN INTERPRETATION:  Left Ventricle: The left ventricular systolic function is normal, with an estimated ejection fraction of 60-65%. The calculated ejection fraction is normal at 65 % using the Nassar's Bi-plane MOD calculation. There are no regional wall motion abnormalities. The left ventricular cavity size is decreased. There is mild to moderate asymmetric left ventricular hypertrophy involving the septal wall. Spectral Doppler shows a normal pattern of left ventricular diastolic filling.  Left Atrium: The left atrium is mildly dilated.  Right Ventricle: The right ventricle is normal in size. There is normal right ventricular global systolic function.  Right Atrium: The right atrium is upper limits of normal in size.  Aortic Valve: The aortic valve is trileaflet. There is no evidence of aortic valve regurgitation. The peak instantaneous gradient of the aortic valve is 9.8 mmHg. The mean gradient of the aortic valve is 4.4 mmHg.  Mitral Valve: The mitral valve is normal in structure. There is mild mitral annular calcification. There is trace mitral valve regurgitation.  Tricuspid Valve: The tricuspid valve is structurally normal. There is trace tricuspid regurgitation. The Doppler estimated RVSP is within normal limits at 30.9 mmHg.  Pulmonic Valve: The pulmonic valve is structurally normal. There is physiologic pulmonic valve regurgitation.  Pericardium: There is no pericardial effusion noted.  Aorta: The aortic root is abnormal. There is mild dilatation of the aortic root.  Systemic Veins: The inferior vena cava appears to be of normal size. There is IVC inspiratory collapse greater than 50%.  In comparison to the previous echocardiogram(s): Compared with study from 4/15/2019, no significant change.        CONCLUSIONS:   1. Left ventricular systolic function is normal with a 60-65% estimated ejection fraction.   2. There is mild to moderate asymmetric left ventricular hypertrophy.    3. RVSP within normal limits.   4. Left ventricular cavity size is decreased.     QUANTITATIVE DATA SUMMARY:  2D MEASUREMENTS:                           Normal Ranges:  LAs:           3.58 cm   (2.7-4.0cm)  IVSd:          1.47 cm   (0.6-1.1cm)  LVPWd:         0.92 cm   (0.6-1.1cm)  LVIDd:         3.77 cm   (3.9-5.9cm)  LVIDs:         2.73 cm  LV Mass Index: 78.2 g/m2  LV % FS        27.4 %     LA VOLUME:                                Normal Ranges:  LA Vol A4C:        44.9 ml    (22+/-6mL/m2)  LA Vol A2C:        65.4 ml  LA Vol BP:         55.7 ml  LA Vol Index A4C:  23.4 ml/m2  LA Vol Index A2C:  34.1 ml/m2  LA Vol Index BP:   29.0 ml/m2  LA Area A4C:       17.2 cm2  LA Area A2C:       20.2 cm2  LA Major Axis A4C: 5.6 cm  LA Major Axis A2C: 5.3 cm  LA Volume Index:   29.2 ml/m2  LA Vol A4C:        43.3 ml  LA Vol A2C:        60.9 ml     RA VOLUME BY A/L METHOD:                        Normal Ranges:  RA Area A4C: 18.3 cm2     M-MODE MEASUREMENTS:                   Normal Ranges:  Ao Root: 3.80 cm (2.0-3.7cm)  LAs:     4.33 cm (2.7-4.0cm)     AORTA MEASUREMENTS:                       Normal Ranges:  Ao Sinus, d: 3.70 cm (2.1-3.5cm)  Ao STJ, d:   3.50 cm (1.7-3.4cm)  Asc Ao, d:   3.00 cm (2.1-3.4cm)     LV SYSTOLIC FUNCTION BY 2D PLANIMETRY (MOD):                      Normal Ranges:  EF-A4C View: 62.6 % (>=55%)  EF-A2C View: 65.8 %  EF-Biplane:  64.7 %     LV DIASTOLIC FUNCTION:                                Normal Ranges:  MV Peak E:        0.94 m/s    (0.7-1.2 m/s)  MV Peak A:        0.97 m/s    (0.42-0.7 m/s)  E/A Ratio:        0.97        (1.0-2.2)  MV e'             0.05 m/s    (>8.0)  MV lateral e'     0.07 m/s  MV medial e'      0.05 m/s  MV A Dur:         122.26 msec  E/e' Ratio:       18.79       (<8.0)  PulmV Sys Ricardo:    39.27 cm/s  PulmV Reeves Ricardo:   29.91 cm/s  PulmV S/D Ricardo:    1.31  PulmV A Revs Ricardo: 20.41 cm/s  PulmV A Revs Dur: 110.73 msec     MITRAL VALVE:                  Normal Ranges:  MV DT: 249  msec (150-240msec)     AORTIC VALVE:                                    Normal Ranges:  AoV Vmax:                1.57 m/s (<=1.7m/s)  AoV Peak P.8 mmHg (<20mmHg)  AoV Mean P.4 mmHg (1.7-11.5mmHg)  LVOT Max Ricardo:            1.15 m/s (<=1.1m/s)  AoV VTI:                 29.45 cm (18-25cm)  LVOT VTI:                24.20 cm  LVOT Diameter:           1.89 cm  (1.8-2.4cm)  AoV Area, VTI:           2.30 cm2 (2.5-5.5cm2)  AoV Area,Vmax:           2.05 cm2 (2.5-4.5cm2)  AoV Dimensionless Index: 0.82        RIGHT VENTRICLE:  RV Basal 3.80 cm  RV Mid   2.90 cm  RV Major 7.8 cm  TAPSE:   21.0 mm  RV s'    0.00 m/s     TRICUSPID VALVE/RVSP:                              Normal Ranges:  Peak TR Velocity: 2.64 m/s  Est. RA Pressure: 3 mmHg  RV Syst Pressure: 30.9 mmHg (< 30mmHg)  IVC Diam:         1.60 cm     PULMONIC VALVE:                          Normal Ranges:  PV Accel Time: 101 msec (>120ms)  PV Max Ricardo:    0.8 m/s  (0.6-0.9m/s)  PV Max P.4 mmHg     Pulmonary Veins:  PulmV A Revs Dur: 110.73 msec  PulmV A Revs Ricardo: 20.41 cm/s  PulmV Reeves Ricardo:   29.91 cm/s  PulmV S/D Ricardo:    1.31  PulmV Sys Ricardo:    39.27 cm/s        [id number] Mitesh Delatorre DO  Electronically signed on 2024 at 7:42:11 PM           ** Final **       LV Ejection Fraction:  EF   Date/Time Value Ref Range Status   2024 04:00 PM 65 %        Cardia monitor 2024        Lab review: I have Chemistry CMP:   Lab Results   Component Value Date    ALBUMIN 3.7 2024    CALCIUM 9.2 2024    CO2 27 2024    CREATININE 0.84 2024    GLUCOSE 103 (H) 2024    BILITOT 0.6 2024    PROT 6.8 2024    ALT 15 2024    AST 27 2024    ALKPHOS 51 2024   , Chemistry BMP   Lab Results   Component Value Date    GLUCOSE 103 (H) 2024    CALCIUM 9.2 2024    CO2 27 2024    CREATININE 0.84 2024   , and CBC:  Lab Results   Component Value Date    WBC 5.0 2024    RBC  4.89 02/13/2024    HGB 15.2 02/13/2024    HCT 44.3 02/13/2024    MCV 91 02/13/2024    MCH 31.1 02/13/2024    MCHC 34.3 02/13/2024    RDW 13.3 02/13/2024    NRBC 0.0 02/13/2024       Assessment/Plan   Problem List Items Addressed This Visit    None  Visit Diagnoses         Codes    Paroxysmal atrial fibrillation (CMS/HCC)     I48.0    Relevant Medications    rivaroxaban (Xarelto) 20 mg tablet    Other Relevant Orders    ECG 12 Lead (Completed)        Episodes are infrequent and have been associated with thyroid abnormalities.   He will cont xarelto for stroke risk reduction.  He will decrease alcohol to decrease bleeding risk on xarelto to less than 8 / week.  He will continue active and healthy lifestyle - follow thyroid function.  Return in 1 year     Fern Dent MD

## 2024-04-03 ENCOUNTER — TELEMEDICINE (OUTPATIENT)
Dept: CARDIOLOGY | Facility: HOSPITAL | Age: 79
End: 2024-04-03
Payer: MEDICARE

## 2024-04-03 VITALS — DIASTOLIC BLOOD PRESSURE: 79 MMHG | SYSTOLIC BLOOD PRESSURE: 122 MMHG | HEART RATE: 54 BPM

## 2024-04-03 DIAGNOSIS — I48.91 ATRIAL FIBRILLATION, UNSPECIFIED TYPE (MULTI): Primary | ICD-10-CM

## 2024-04-03 DIAGNOSIS — I48.0 AF (PAROXYSMAL ATRIAL FIBRILLATION) (MULTI): ICD-10-CM

## 2024-04-03 DIAGNOSIS — I11.0: ICD-10-CM

## 2024-04-03 PROCEDURE — 3078F DIAST BP <80 MM HG: CPT | Performed by: STUDENT IN AN ORGANIZED HEALTH CARE EDUCATION/TRAINING PROGRAM

## 2024-04-03 PROCEDURE — 1160F RVW MEDS BY RX/DR IN RCRD: CPT | Performed by: STUDENT IN AN ORGANIZED HEALTH CARE EDUCATION/TRAINING PROGRAM

## 2024-04-03 PROCEDURE — 1159F MED LIST DOCD IN RCRD: CPT | Performed by: STUDENT IN AN ORGANIZED HEALTH CARE EDUCATION/TRAINING PROGRAM

## 2024-04-03 PROCEDURE — 99213 OFFICE O/P EST LOW 20 MIN: CPT | Performed by: STUDENT IN AN ORGANIZED HEALTH CARE EDUCATION/TRAINING PROGRAM

## 2024-04-03 PROCEDURE — 3074F SYST BP LT 130 MM HG: CPT | Performed by: STUDENT IN AN ORGANIZED HEALTH CARE EDUCATION/TRAINING PROGRAM

## 2024-04-03 NOTE — PROGRESS NOTES
Referring Clinician:.  Self referred  Accompanied by: alone  Primary Care Provider: Roni Gaines MD   Visit Type:  Follow Up   On Call: Patient       Verbal consent was requested and obtained from patient on this date for a telehealth visit.    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  Review of the electronic medical record shows a past medical history significant for Graves disease s/p HAWLEY with hypothyroidism maintained on supplementation therapy, prostatic carcinoma status post prostatectomy, ED s/p IPP, paroxysmal atrial fibrillation s/p DCCV  ~2004 maintained on DOAC, hypogonadism maintained on testosterone therapy, hyperlipidemia, history of bilateral pulmonary embolism and left DVT 2019 (precipitant thought to be prostatectomy surgery), depression, ex smoking history.  He presented to the emergency room 2/13/2024 with palpitations which spontaneously resolved.  He was prescribed metoprolol succinate and his dose of rivaroxaban was increased to 20 mg once daily prior to discharge.  TFTs done were satisfactory.  TTE 4/2019 demonstrated normal biventricular systolic function with LVEF 60-65%.  TTE 2/2024 demonstrated normal biventricular systolic function with mild LVH.  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 Dent, and has been advised to remain on DOAC, and to reduce his alcohol intake to minimize the risk of bleeding with DOAC.  He will continue to be seen by EP on an annual basis.    TTE done 2/2024 demonstrated normal biventricular systolic function with mild LV hypertrophy.    Symptomatically he denies chest pain, dyspnea at rest, exertional dyspnea, PND, orthopnea.  He does have chronic leg swelling bilaterally.  He had palpitations at the time when he presented to the emergency room, but does not normally have palpitations/palpitation awareness.  He further denies syncope or presyncope.    He monitors his blood pressure at home and his  systolics normally run 110-125 mmHg.    Functionally, exercise capacity is described as unlimited and he does do regular exercise.    He is fully adherent with prescribed medications.     Surgical Hx:  -Prostatectomy    Social Hx:  - Smoking-quit 1988  - ETOH-1 glass of wine a day  - Illicit drugs-never  -  - Lives w alone and cooks well on his own ith     Family Hx:  Specifically, there is no family history of  CAD, heart failure, ICD, PPM, LVAD, OHT, arrhythmias, CVA, or sudden cardiac death.    Medication reconciliation completed, see below.     Medication Documentation Review Audit       Reviewed by Billie Lujan RN (Registered Nurse) on 04/03/24 at 1118      Medication Order Taking? Sig Documenting Provider Last Dose Status   allopurinol (Zyloprim) 100 mg tablet [id number] Yes TAKE 1 TABLET DAILY Roni Gaines MD Taking Active   alpha tocopherol (Vitamin E) 268 mg (400 unit) capsule [id number] Yes Take 1 capsule (400 Units) by mouth once daily. Historical Provider, MD Taking Active   cycloSPORINE (Restasis) 0.05 % ophthalmic emulsion [id number] Yes Administer 1 drop into both eyes every 12 hours. Historical Provider, MD Taking Active   escitalopram (Lexapro) 10 mg tablet [id number] Yes Take 1 tablet (10 mg) by mouth once daily. Roni Gaines MD Taking Active   levothyroxine (Synthroid, Levoxyl) 100 mcg tablet [id number] Yes Take 1 tablet (100 mcg) by mouth once daily. Roni Gaines MD Taking Active   mirabegron (Myrbetriq) 24 hr tablet 50 mg [id number]   Cristofer Rocha MD  Active   mirabegron (Myrbetriq) 50 mg tablet extended release 24 hr 24 hr tablet [id number] Yes Take 1 tablet (50 mg) by mouth once daily. Cristofer Rocha MD Taking Active   multivitamin (Multiple Vitamins) tablet [id number] Yes Take 1 tablet by mouth once daily. Historical Provider, MD Taking Active   Myrbetriq 50 mg tablet extended release 24 hr 24 hr tablet [id number] No Take 1 tablet (50 mg) by mouth once daily. Historical  MD Nancy Not Taking Active   pravastatin (Pravachol) 40 mg tablet [id number] Yes TAKE 1 TABLET DAILY AT BEDTIME Roni Gaines MD Taking Active   rivaroxaban (Xarelto) 20 mg tablet [id number] Yes Take 1 tablet (20 mg) by mouth once daily with breakfast. Fern Dent MD Taking Active   testosterone (Fortesta) 10 mg/0.5 gram /actuation gel in metered-dose pump gel [id number] Yes Place 4 Pump on the skin once daily in the morning. Roni Gaines MD Taking Active                     No Known Allergies    Review of Systems   Constitutional: Negative.   Eyes: Negative.    Cardiovascular:  Positive for palpitations.   Respiratory: Negative.     Skin: Negative.    Gastrointestinal: Negative.    Genitourinary: Negative.    Neurological: Negative.       Investigations:    The electronic medical record has been reviewed by me for salient history. All cardiovascular imaging and testing available in the electronic medical record, and [employer] has been reviewed.     Vitals:    04/03/24 1142   BP: 122/79  Comment: pt reported   Pulse: 54  Comment: pt reported      On examination:    Very pleasant elderly -American man n no apparent CP or painful distress  Immaculately  groomed   Neck: No JVD or HJR  CVS: HS 1,2.   No added sounds  Resp: CTA bilaterally.    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  Review of the electronic medical record shows a past medical history significant for Graves disease s/p HAWLEY with hypothyroidism maintained on supplementation therapy, prostatic carcinoma status post prostatectomy, ED s/p IPP, paroxysmal atrial fibrillation s/p DCCV  ~2004 maintained on DOAC, hypogonadism maintained on testosterone therapy, hyperlipidemia, history of bilateral pulmonary embolism and left DVT 2019 (precipitant thought to be prostatectomy surgery), depression, ex smoking history. He  presented to the emergency room 2/13/2024 with palpitations which spontaneously resolved.  He was prescribed metoprolol succinate and his dose of rivaroxaban was increased to 20 mg once daily prior to discharge.  TFTs done were satisfactory.  TTE 4/2019 demonstrated normal biventricular systolic function with LVEF 60-65%.  TTE done 2/2024 showed normal biventricular systolic function with mild LVH.    Designated HC PoA: daughter Marybeth, date verified 2/15/2024    PLAN:    #Paroxysmal atrial fibrillation    -TTE to re-assess LVH in 3/2025  -BNP in 3/2025  -Continue systemic anticoagulation, per electrophysiology cardiology  -We discussed that he will continue his exercise regimen, and notify if any symptoms emerge    This note was transcribed using the Dragon Dictation system. There may be grammatical, punctuation, or verbiage errors that can occur with voice recognition programs.    Judith Knowles MD PhD    Time Spent: 20 minutes ( preparation, visit and documentation)

## 2024-04-03 NOTE — PATIENT INSTRUCTIONS
- TTE in March 2025  -BNP in March 2025  -In person follow-up at [facility] in March/April 2025 after testing

## 2024-04-07 LAB
ATRIAL RATE: 53 BPM
P AXIS: 44 DEGREES
P OFFSET: 194 MS
P ONSET: 139 MS
PR INTERVAL: 174 MS
Q ONSET: 226 MS
QRS COUNT: 9 BEATS
QRS DURATION: 86 MS
QT INTERVAL: 456 MS
QTC CALCULATION(BAZETT): 427 MS
QTC FREDERICIA: 437 MS
R AXIS: -42 DEGREES
T AXIS: 29 DEGREES
T OFFSET: 454 MS
VENTRICULAR RATE: 53 BPM

## 2024-04-10 ENCOUNTER — LAB (OUTPATIENT)
Dept: LAB | Facility: LAB | Age: 79
End: 2024-04-10
Payer: MEDICARE

## 2024-04-10 DIAGNOSIS — E03.9 HYPOTHYROIDISM, UNSPECIFIED TYPE: ICD-10-CM

## 2024-04-10 LAB — TSH SERPL-ACNC: 0.61 MIU/L (ref 0.44–3.98)

## 2024-04-10 PROCEDURE — 84443 ASSAY THYROID STIM HORMONE: CPT

## 2024-04-10 PROCEDURE — 36415 COLL VENOUS BLD VENIPUNCTURE: CPT

## 2024-04-16 ENCOUNTER — CLINICAL SUPPORT (OUTPATIENT)
Dept: AUDIOLOGY | Facility: CLINIC | Age: 79
End: 2024-04-16

## 2024-04-16 DIAGNOSIS — H90.3 BILATERAL SENSORINEURAL HEARING LOSS: Primary | ICD-10-CM

## 2024-04-16 PROCEDURE — V5110 HEARING AID DISPENSING FEE: HCPCS | Mod: AUDSP | Performed by: AUDIOLOGIST

## 2024-04-16 PROCEDURE — V5261 HEARING AID, DIGIT, BIN, BTE: HCPCS | Mod: AUDSP

## 2024-04-16 ASSESSMENT — PAIN SCALES - GENERAL: PAINLEVEL_OUTOF10: 0 - NO PAIN

## 2024-04-16 ASSESSMENT — PAIN - FUNCTIONAL ASSESSMENT: PAIN_FUNCTIONAL_ASSESSMENT: 0-10

## 2024-04-16 NOTE — PROGRESS NOTES
AUDIOLOGY HEARING AID FITTING      Name:  Arturo Brumfield MD   :  1945   Age:  78 y.o.   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 Most recent audiologic evaluation performed on 3/7/2024 revealed hearing within normal limits sloping to a moderately-severe sensorineural hearing loss.     HEARING AIDS:    Hearing Aid Information Right Left    Phonak Phonak   Model Audéo L70-R Audéo L70-R   Serial Number [id number]   /Tubing 1 M 1 M   Dome Small Vented Small Vented   Retention No No   Wax Traps Cerustop Cerustop     Repair Warranty 2027   Loss and Damage Warranty 2027   Fitting Date 2024    Fitting Audiologist Dion Berger CCC-A  Clinical Audiologist       Paired to Phone for phone calls: No  Paired to smart phone application: Yes  Gain Level: 100%  Volume controls active: Yes  Fit to Audiogram performed on 3/7/2024      HEARING AID ORITENTATION:  Low-battery and volume control signals were demonstrated for the patient.        PROGRAMMING, VERIFICATION, and VALIDATION MEASURES  Otoscopy revealed clear ear canals with visible cones of light bilaterally.      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 Adjustments were made based on measurement of soft (55 dB SPL), average (65 dB SPL), and loud (75 dB dB SPL) speech, as well as maximum permissible output (MPO), to ensure that Mr. Brumfield is being provided with the most appropriate amplification. The devices were found to meet prescriptive targets from 250-8000 Hz for for both ears.      IMPRESSIONS:  Today's 1-hour hearing aid fitting appointment was spent discussing use, care, and maintenance of the hearing devices. The patient was able to properly insert and remove the hearing instrument from the ear.  In addition to today's verbal instruction of the  hearing devices, the patient was given written instructions from the hearing aid .  Hearing aid limitations were discussed at length as well as realistic expectations. The patient was advised in order to receive full benefit of amplification, consistent use during all waking hours is recommended. The repair warranty (coverage/cost from the hearing aid  and not the responsibility of [facility]) and the conditions of the 30 day right-to-return period (ending 5/16/2024) were discussed.    I will order 0M receivers and fit them at his next visit if necessary.    We paired him to the lauren and I demonstrated it's use. We reviewed cleaning and care. I will completed aided testing at his follow-up appointment. He paid for his hearing aids and the fitting fee at checkout today.       RECOMMENDATIONS:  Strive for full-time device use during waking hours, except when activities preclude device safety.  Return for hearing aid fitting follow-up appointment.  Contact your audiologist if you have any issues prior to your next appointment to address the issue in a timely manner.       PATIENT EDUCATION:  Discussed results and recommendations with Dr. Brumfield. Questions were addressed and the patient was encouraged to contact our department at [phone] should concerns arise.       Dion Berger, CCC-A  Clinical Audiologist

## 2024-04-23 PROBLEM — N40.0 BENIGN PROSTATIC HYPERPLASIA: Status: ACTIVE | Noted: 2024-04-23

## 2024-04-23 PROBLEM — R41.3 MEMORY IMPAIRMENT: Status: ACTIVE | Noted: 2023-04-07

## 2024-04-23 PROBLEM — Z85.46 HISTORY OF MALIGNANT NEOPLASM OF PROSTATE: Status: ACTIVE | Noted: 2024-04-23

## 2024-04-23 PROBLEM — R42 DIZZINESS AND GIDDINESS: Status: ACTIVE | Noted: 2024-02-26

## 2024-04-23 PROBLEM — I48.0 PAROXYSMAL ATRIAL FIBRILLATION (MULTI): Status: ACTIVE | Noted: 2024-03-27

## 2024-04-23 PROBLEM — N40.1 BPH WITH OBSTRUCTION/LOWER URINARY TRACT SYMPTOMS: Status: ACTIVE | Noted: 2019-01-04

## 2024-04-23 PROBLEM — N13.8 BPH WITH OBSTRUCTION/LOWER URINARY TRACT SYMPTOMS: Status: ACTIVE | Noted: 2019-01-04

## 2024-04-23 PROBLEM — U07.1 DISEASE DUE TO SEVERE ACUTE RESPIRATORY SYNDROME CORONAVIRUS 2 (SARS-COV-2): Status: ACTIVE | Noted: 2024-04-23

## 2024-04-23 PROBLEM — R00.2 PALPITATIONS: Status: ACTIVE | Noted: 2024-02-15

## 2024-04-23 PROBLEM — N39.41 URGENCY INCONTINENCE: Status: ACTIVE | Noted: 2024-04-23

## 2024-04-29 ENCOUNTER — CLINICAL SUPPORT (OUTPATIENT)
Dept: AUDIOLOGY | Facility: CLINIC | Age: 79
End: 2024-04-29

## 2024-04-29 DIAGNOSIS — H90.3 BILATERAL SENSORINEURAL HEARING LOSS: Primary | ICD-10-CM

## 2024-04-29 NOTE — PROGRESS NOTES
ADULT HEARING AID CHECK      Name:  Arturo Brumfield MD   :  1945   Age:  79 y.o.   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  He is very satisfied with the sound quality of his HAs and feels as if no adjustments need to be made at this time. He is getting a full day charge out of batteries. Visual inspection was unremarkable. I cleaned and checked the hearing aids. I replaced wax traps and domes. I added retention lines due to patient reported retention trouble. Listening check revealed his hearing aids are in good working condition. Patient was counseled to return should retention continue to be a problem. He encouraged to contact us sooner should He have any problems. Patient was not charged for today's appointment, they are within the first 90 days of their hearing aid fitting.     HEARING AIDS:    Hearing Aid Information Right Left    Phonak Phonak   Model Audéo L70-R Audéo L70-R   Serial Number [id number]   /Tubing 1 M 1 M   Dome Small Vented Small Vented   Retention No No   Wax Traps Cerustop Cerustop     Repair Warranty 2027   Loss and Damage Warranty 2027   Fitting Date 2024    Fitting Audiologist Dion Berger, CCC-A  Clinical Audiologist       Paired to Phone for phone calls: No  Paired to smart phone application: Yes  Gain Level: 100%  Volume controls active: Yes  Fit to Audiogram performed on 3/7/2024    AIDED TESTING:   Completed in the sound field at zero degree azimuth with binaural hearing aids:   - Pure tone thresholds acquired 15-35 dB HL  - SRT: 20 dB HL with monitored live voice stimuli  - WRS revealed to be 100% at 60 dB HL. Recorded NU6 list utilized.    CHANA Ramsey under the supervision of Dion Kim CCC-A

## 2024-05-15 PROBLEM — C61 PROSTATE CANCER (MULTI): Status: ACTIVE | Noted: 2024-05-15

## 2024-05-15 PROBLEM — Z90.79 HX OF RADICAL PROSTATECTOMY: Status: ACTIVE | Noted: 2024-05-15

## 2024-05-15 NOTE — PROGRESS NOTES
UROLOGIC FOLLOW-UP VISIT     PROBLEM LIST:    1. Urinary incontinence, unspecified type        2. Prostate cancer (Multi)        3. S/P insertion of penile implant        4. Hypogonadism male        5. Erectile dysfunction after radical prostatectomy        6. 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 Harris. Dr. Brumfield transferred care to me in December 2022. At that time his main complaint was occasional urge incontinence which has worsened since IPP placement. He states that this occurred once or twice every few weeks requiring him to wear a pad. After his last appointment he tried behavioral modifications such as timed voiding however this has been unsuccessful. He's currently on myrbetriq and continues to have urge incontinence. He denies any MALIKA.     His  PSA 12/7/22 0.11, 3/1/2023 0.15, 9/25/2023 0.15, 2/26/24 0.23. Patient also endorses continued erectile dysfunction after his prostatectomy and is failed Cialis treatment. He underwent Insertion of 3 piece inflatable penile prosthesis 06/22/23 with Dr. Hall and is being followed for his erection concerns.    He denies any current fevers, chills, nausea, vomiting, unintentional weight loss, increased fatigue or new onset bone pain.      PAST MEDICAL HISTORY:  No past medical history on file.    PAST SURGICAL HISTORY:  Past Surgical History:   Procedure Laterality Date    COLONOSCOPY  11/15/2017    Complete Colonoscopy    OTHER SURGICAL HISTORY  05/31/2017    Atrial Cardioversion    PROSTATECTOMY          ALLERGIES:   No Known Allergies     MEDICATIONS:     Current Outpatient Medications:     allopurinol (Zyloprim) 100 mg tablet, TAKE 1 TABLET DAILY, Disp: 90 tablet, Rfl: 3    alpha tocopherol (Vitamin E) 268 mg (400 unit)  capsule, Take 1 capsule (400 Units) by mouth once daily., Disp: , Rfl:     cycloSPORINE (Restasis) 0.05 % ophthalmic emulsion, Administer 1 drop into both eyes every 12 hours., Disp: , Rfl:     escitalopram (Lexapro) 10 mg tablet, Take 1 tablet (10 mg) by mouth once daily., Disp: 90 tablet, Rfl: 3    levothyroxine (Synthroid, Levoxyl) 100 mcg tablet, Take 1 tablet (100 mcg) by mouth once daily., Disp: 90 tablet, Rfl: 3    mirabegron (Myrbetriq) 50 mg tablet extended release 24 hr 24 hr tablet, Take 1 tablet (50 mg) by mouth once daily., Disp: 30 tablet, Rfl: 2    multivitamin (Multiple Vitamins) tablet, Take 1 tablet by mouth once daily., Disp: , Rfl:     Myrbetriq 50 mg tablet extended release 24 hr 24 hr tablet, Take 1 tablet (50 mg) by mouth once daily., Disp: , Rfl:     pravastatin (Pravachol) 40 mg tablet, TAKE 1 TABLET DAILY AT BEDTIME, Disp: 90 tablet, Rfl: 3    rivaroxaban (Xarelto) 20 mg tablet, Take 1 tablet (20 mg) by mouth once daily with breakfast., Disp: 90 tablet, Rfl: 3    testosterone (Fortesta) 10 mg/0.5 gram /actuation gel in metered-dose pump gel, Place 4 Pump on the skin once daily in the morning., Disp: 3 each, Rfl: 3  No current facility-administered medications for this visit.      SOCIAL HISTORY:  Patient  reports that he has never smoked. He has never used smokeless tobacco. He reports current alcohol use of about 3.0 standard drinks of alcohol per week. He reports that he does not use drugs.   Social History     Socioeconomic History    Marital status:      Spouse name: Not on file    Number of children: Not on file    Years of education: Not on file    Highest education level: Not on file   Occupational History    Not on file   Tobacco Use    Smoking status: Never    Smokeless tobacco: Never   Substance and Sexual Activity    Alcohol use: Yes     Alcohol/week: 3.0 standard drinks of alcohol     Types: 3 Shots of liquor per week     Comment: 4-5 times week    Drug use: Never     Sexual activity: Not on file   Other Topics Concern    Not on file   Social History Narrative    Not on file     Social Determinants of Health     Financial Resource Strain: Not on file   Food Insecurity: Not on file   Transportation Needs: Not on file   Physical Activity: Not on file   Stress: Not on file   Social Connections: Not on file   Intimate Partner Violence: Not on file   Housing Stability: Not on file       FAMILY HISTORY:  Family History   Problem Relation Name Age of Onset    Heart attack Mother      Heart attack Father      Alzheimer's disease Sister         REVIEW OF SYSTEMS:   Constitutional: Negative for fever and chills. Denies anorexia, weight loss.  Eyes: Negative for visual disturbance.   Respiratory: Negative for shortness of breath.    Cardiovascular: Negative for chest pain.   Gastrointestinal: Negative for nausea and vomiting.   Genitourinary: See interval history above.  Skin: Negative for rash.   Neurological: Negative for dizziness and numbness.   Psychiatric/Behavioral: Negative for confusion and decreased concentration.     PHYSICAL EXAM:  There were no vitals taken for this visit.  Constitutional: Patient appears well-developed and well-nourished. No distress.    Head: Normocephalic and atraumatic.    Neck: Normal range of motion.    Cardiovascular: Normal rate.    Pulmonary/Chest: Effort normal. No respiratory distress.   Abdominal: Soft nontender nondistended  Musculoskeletal: Normal range of motion.    Neurological: Alert and oriented to person, place, and time.  Psychiatric: Normal mood and affect. Behavior is normal. Thought content normal.      LABORATORY REVIEW:     Lab Results   Component Value Date    BUN 17 02/13/2024    CREATININE 0.84 02/13/2024    EGFR 89 02/13/2024     02/13/2024    K 3.8 02/13/2024     02/13/2024    CO2 27 02/13/2024    CALCIUM 9.2 02/13/2024      Lab Results   Component Value Date    WBC 5.0 02/13/2024    RBC 4.89 02/13/2024    HGB 15.2  02/13/2024    HCT 44.3 02/13/2024    MCV 91 02/13/2024    MCH 31.1 02/13/2024    MCHC 34.3 02/13/2024    RDW 13.3 02/13/2024     02/13/2024        Lab Results   Component Value Date    PSA 0.23 02/26/2024    PSA 0.15 09/25/2023    PSA 0.15 03/01/2023    PSA 0.11 12/07/2022    PSA <0.10 10/08/2021    PSA <0.10 10/23/2019    PSA <0.10 04/15/2019    PSA <0.10 03/12/2019    PSA 6.9 (H) 10/16/2018        Assessment:      1. Urinary incontinence, unspecified type        2. Prostate cancer (Multi)        3. S/P insertion of penile implant        4. Hypogonadism male        5. Erectile dysfunction after radical prostatectomy        6. Hx of radical prostatectomy              Plan:   Reviewed and interpreted patient's PSA trend  Will obtain PSA today  Will add on an antimuscarinic in addition to his Myrbetriq to help with his urge incontinence  If this does not improve his urge incontinence will obtain a urodynamic study  Continue every 3 months PSA checks    31 minutes total spent on patient's care today; >50% time spent on counseling/coordination of care

## 2024-05-20 ENCOUNTER — OFFICE VISIT (OUTPATIENT)
Dept: UROLOGY | Facility: HOSPITAL | Age: 79
End: 2024-05-20
Payer: MEDICARE

## 2024-05-20 DIAGNOSIS — Z96.0 S/P INSERTION OF PENILE IMPLANT: ICD-10-CM

## 2024-05-20 DIAGNOSIS — E29.1 HYPOGONADISM MALE: ICD-10-CM

## 2024-05-20 DIAGNOSIS — R32 URINARY INCONTINENCE, UNSPECIFIED TYPE: ICD-10-CM

## 2024-05-20 DIAGNOSIS — Z90.79 HX OF RADICAL PROSTATECTOMY: ICD-10-CM

## 2024-05-20 DIAGNOSIS — N52.31 ERECTILE DYSFUNCTION AFTER RADICAL PROSTATECTOMY: ICD-10-CM

## 2024-05-20 DIAGNOSIS — C61 PROSTATE CANCER (MULTI): Primary | ICD-10-CM

## 2024-05-20 PROCEDURE — 1160F RVW MEDS BY RX/DR IN RCRD: CPT | Performed by: STUDENT IN AN ORGANIZED HEALTH CARE EDUCATION/TRAINING PROGRAM

## 2024-05-20 PROCEDURE — 99214 OFFICE O/P EST MOD 30 MIN: CPT | Performed by: STUDENT IN AN ORGANIZED HEALTH CARE EDUCATION/TRAINING PROGRAM

## 2024-05-20 PROCEDURE — 1159F MED LIST DOCD IN RCRD: CPT | Performed by: STUDENT IN AN ORGANIZED HEALTH CARE EDUCATION/TRAINING PROGRAM

## 2024-05-20 RX ORDER — TROSPIUM CHLORIDE ER 60 MG/1
60 CAPSULE ORAL DAILY
Qty: 30 CAPSULE | Refills: 3 | Status: SHIPPED | OUTPATIENT
Start: 2024-05-20

## 2024-05-20 NOTE — LETTER
May 20, 2024       No Recipients    Patient: Arturo Brumfield MD   YOB: 1945   Date of Visit: 5/20/2024       Dear Dr. Soriano Recipients:    Thank you for referring Arturo Brumfield to me for evaluation. Below are my notes for this consultation.  If you have questions, please do not hesitate to call me. I look forward to following your patient along with you.       Sincerely,     Cristofer Rocha MD      CC:   No Recipients  ______________________________________________________________________________________     UROLOGIC FOLLOW-UP VISIT     PROBLEM LIST:    1. Urinary incontinence, unspecified type        2. Prostate cancer (Multi)        3. S/P insertion of penile implant        4. Hypogonadism male        5. Erectile dysfunction after radical prostatectomy        6. 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 Harris. Dr. Brumfield transferred care to me in December 2022. At that time his main complaint was occasional urge incontinence which has worsened since IPP placement. He states that this occurred once or twice every few weeks requiring him to wear a pad. After his last appointment he tried behavioral modifications such as timed voiding however this has been unsuccessful. He's currently on myrbetriq and continues to have urge incontinence. He denies any MALIKA.     His  PSA 12/7/22 0.11, 3/1/2023 0.15, 9/25/2023 0.15, 2/26/24 0.23. Patient also endorses continued erectile dysfunction after his prostatectomy and is failed Cialis treatment. He underwent Insertion of 3 piece inflatable penile prosthesis 06/22/23 with Dr. Hall and is being followed for his erection concerns.    He denies any current fevers, chills, nausea, vomiting, unintentional weight loss, increased fatigue  or new onset bone pain.      PAST MEDICAL HISTORY:  No past medical history on file.    PAST SURGICAL HISTORY:  Past Surgical History:   Procedure Laterality Date   • COLONOSCOPY  11/15/2017    Complete Colonoscopy   • OTHER SURGICAL HISTORY  05/31/2017    Atrial Cardioversion   • PROSTATECTOMY          ALLERGIES:   No Known Allergies     MEDICATIONS:     Current Outpatient Medications:   •  allopurinol (Zyloprim) 100 mg tablet, TAKE 1 TABLET DAILY, Disp: 90 tablet, Rfl: 3  •  alpha tocopherol (Vitamin E) 268 mg (400 unit) capsule, Take 1 capsule (400 Units) by mouth once daily., Disp: , Rfl:   •  cycloSPORINE (Restasis) 0.05 % ophthalmic emulsion, Administer 1 drop into both eyes every 12 hours., Disp: , Rfl:   •  escitalopram (Lexapro) 10 mg tablet, Take 1 tablet (10 mg) by mouth once daily., Disp: 90 tablet, Rfl: 3  •  levothyroxine (Synthroid, Levoxyl) 100 mcg tablet, Take 1 tablet (100 mcg) by mouth once daily., Disp: 90 tablet, Rfl: 3  •  mirabegron (Myrbetriq) 50 mg tablet extended release 24 hr 24 hr tablet, Take 1 tablet (50 mg) by mouth once daily., Disp: 30 tablet, Rfl: 2  •  multivitamin (Multiple Vitamins) tablet, Take 1 tablet by mouth once daily., Disp: , Rfl:   •  Myrbetriq 50 mg tablet extended release 24 hr 24 hr tablet, Take 1 tablet (50 mg) by mouth once daily., Disp: , Rfl:   •  pravastatin (Pravachol) 40 mg tablet, TAKE 1 TABLET DAILY AT BEDTIME, Disp: 90 tablet, Rfl: 3  •  rivaroxaban (Xarelto) 20 mg tablet, Take 1 tablet (20 mg) by mouth once daily with breakfast., Disp: 90 tablet, Rfl: 3  •  testosterone (Fortesta) 10 mg/0.5 gram /actuation gel in metered-dose pump gel, Place 4 Pump on the skin once daily in the morning., Disp: 3 each, Rfl: 3  No current facility-administered medications for this visit.      SOCIAL HISTORY:  Patient  reports that he has never smoked. He has never used smokeless tobacco. He reports current alcohol use of about 3.0 standard drinks of alcohol per week. He  reports that he does not use drugs.   Social History     Socioeconomic History   • Marital status:      Spouse name: Not on file   • Number of children: Not on file   • Years of education: Not on file   • Highest education level: Not on file   Occupational History   • Not on file   Tobacco Use   • Smoking status: Never   • Smokeless tobacco: Never   Substance and Sexual Activity   • Alcohol use: Yes     Alcohol/week: 3.0 standard drinks of alcohol     Types: 3 Shots of liquor per week     Comment: 4-5 times week   • Drug use: Never   • Sexual activity: Not on file   Other Topics Concern   • Not on file   Social History Narrative   • Not on file     Social Determinants of Health     Financial Resource Strain: Not on file   Food Insecurity: Not on file   Transportation Needs: Not on file   Physical Activity: Not on file   Stress: Not on file   Social Connections: Not on file   Intimate Partner Violence: Not on file   Housing Stability: Not on file       FAMILY HISTORY:  Family History   Problem Relation Name Age of Onset   • Heart attack Mother     • Heart attack Father     • Alzheimer's disease Sister         REVIEW OF SYSTEMS:   Constitutional: Negative for fever and chills. Denies anorexia, weight loss.  Eyes: Negative for visual disturbance.   Respiratory: Negative for shortness of breath.    Cardiovascular: Negative for chest pain.   Gastrointestinal: Negative for nausea and vomiting.   Genitourinary: See interval history above.  Skin: Negative for rash.   Neurological: Negative for dizziness and numbness.   Psychiatric/Behavioral: Negative for confusion and decreased concentration.     PHYSICAL EXAM:  There were no vitals taken for this visit.  Constitutional: Patient appears well-developed and well-nourished. No distress.    Head: Normocephalic and atraumatic.    Neck: Normal range of motion.    Cardiovascular: Normal rate.    Pulmonary/Chest: Effort normal. No respiratory distress.   Abdominal: Soft  nontender nondistended  Musculoskeletal: Normal range of motion.    Neurological: Alert and oriented to person, place, and time.  Psychiatric: Normal mood and affect. Behavior is normal. Thought content normal.      LABORATORY REVIEW:     Lab Results   Component Value Date    BUN 17 02/13/2024    CREATININE 0.84 02/13/2024    EGFR 89 02/13/2024     02/13/2024    K 3.8 02/13/2024     02/13/2024    CO2 27 02/13/2024    CALCIUM 9.2 02/13/2024      Lab Results   Component Value Date    WBC 5.0 02/13/2024    RBC 4.89 02/13/2024    HGB 15.2 02/13/2024    HCT 44.3 02/13/2024    MCV 91 02/13/2024    MCH 31.1 02/13/2024    MCHC 34.3 02/13/2024    RDW 13.3 02/13/2024     02/13/2024        Lab Results   Component Value Date    PSA 0.23 02/26/2024    PSA 0.15 09/25/2023    PSA 0.15 03/01/2023    PSA 0.11 12/07/2022    PSA <0.10 10/08/2021    PSA <0.10 10/23/2019    PSA <0.10 04/15/2019    PSA <0.10 03/12/2019    PSA 6.9 (H) 10/16/2018        Assessment:      1. Urinary incontinence, unspecified type        2. Prostate cancer (Multi)        3. S/P insertion of penile implant        4. Hypogonadism male        5. Erectile dysfunction after radical prostatectomy        6. Hx of radical prostatectomy              Plan:   Reviewed and interpreted patient's PSA trend  Will obtain PSA today  Will add on an antimuscarinic in addition to his Myrbetriq to help with his urge incontinence  If this does not improve his urge incontinence will obtain a urodynamic study  Continue every 3 months PSA checks    31 minutes total spent on patient's care today; >50% time spent on counseling/coordination of care

## 2024-05-29 DIAGNOSIS — R32 URINARY INCONTINENCE, UNSPECIFIED TYPE: ICD-10-CM

## 2024-05-31 ENCOUNTER — LAB (OUTPATIENT)
Dept: LAB | Facility: LAB | Age: 79
End: 2024-05-31
Payer: MEDICARE

## 2024-05-31 DIAGNOSIS — C61 PROSTATE CANCER (MULTI): ICD-10-CM

## 2024-05-31 LAB — PSA SERPL-MCNC: 0.24 NG/ML

## 2024-05-31 PROCEDURE — 36415 COLL VENOUS BLD VENIPUNCTURE: CPT

## 2024-05-31 PROCEDURE — 84153 ASSAY OF PSA TOTAL: CPT

## 2024-06-04 RX ORDER — MIRABEGRON 50 MG/1
50 TABLET, FILM COATED, EXTENDED RELEASE ORAL DAILY
Qty: 30 TABLET | Refills: 2 | Status: SHIPPED | OUTPATIENT
Start: 2024-06-04

## 2024-06-07 DIAGNOSIS — H04.123 DRY EYES, BILATERAL: Primary | ICD-10-CM

## 2024-06-10 RX ORDER — CYCLOSPORINE 0.5 MG/ML
EMULSION OPHTHALMIC
Qty: 180 EACH | Refills: 3 | Status: SHIPPED | OUTPATIENT
Start: 2024-06-10

## 2024-06-18 ENCOUNTER — CLINICAL SUPPORT (OUTPATIENT)
Dept: AUDIOLOGY | Facility: CLINIC | Age: 79
End: 2024-06-18
Payer: MEDICARE

## 2024-06-18 DIAGNOSIS — H90.3 BILATERAL SENSORINEURAL HEARING LOSS: Primary | ICD-10-CM

## 2024-06-18 ASSESSMENT — PAIN SCALES - GENERAL: PAINLEVEL_OUTOF10: 0 - NO PAIN

## 2024-06-18 ASSESSMENT — PAIN - FUNCTIONAL ASSESSMENT: PAIN_FUNCTIONAL_ASSESSMENT: 0-10

## 2024-06-28 NOTE — PROGRESS NOTES
ADULT HEARING AID CHECK      Name:  Arturo Brumfield MD   :  1945   Age:  79 y.o.   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 He is doing well overall with the hearing aids and does not want any adjustments made at this time.       HEARING AIDS:    Hearing Aid Information Right Left    Phonak Phonak   Model Audéo L70-R Audéo L70-R   Serial Number [id number]   /Tubing 1 M 1 M   Dome Small Vented Small Vented   Retention No No   Wax Traps Cerustop Cerustop      Repair Warranty 2027   Loss and Damage Warranty 2027   Fitting Date 2024    Fitting Audiologist Dion Berger CCC-A  Clinical Audiologist         Paired to Phone for phone calls: No  Paired to smart phone application: Yes  Gain Level: 100%  Volume controls active: Yes  Fit to Audiogram performed on 3/7/2024       AIDED TESTING:  Sound field testing was conducted in the aided condition to assess aided responses and benefit achieved with fitted amplification. Responses were obtained in a normal to mild range with both hearing aids.      All testing was performed with recorded speech stimuli at 55 dB HL.     Test Condition PTA SRT   Binaural Hearing Aids 22 dB HL  25 dB HL      Consonant-Nucleus-Consonant (CNC) Word List in Quiet  Test Condition List # Score   Binaural Hearing Aids 1 100%     AzBio Sentences in Quiet  Test Condition List # Score   Binaural Hearing Aids 1 100%     AzBio Sentences (+10 SNR)  Test Condition List # Score   Binaural Hearing Aids 2 100%       IMPRESSIONS:  We reviewed cleaning and care during today's appointment and he reported good understanding of the topics discussed. He should return as needed for hearing aid checks and annually for audiologic assessments.     He was not charged for today's appointment as he is within the first 90 days of  his fitting.       RECOMMENDATIONS:  1.) Strive for full-time use of the hearing aids except when activities preclude device safety.  2.) Return as needed for hearing aid checks.        Dion Berger, CCC-A  Clinical Audiologist

## 2024-06-28 NOTE — PROGRESS NOTES
ADULT HEARING AID CHECK      Name:  Arturo Brumfield MD   :  1945   Age:  79 y.o.   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 He is doing well overall with the hearing aids and does not want any adjustments made at this time.       HEARING AIDS:    Hearing Aid Information Right Left    Phonak Phonak   Model Audéo L70-R Audéo L70-R   Serial Number [id number]   /Tubing 1 M 1 M   Dome Small Vented Small Vented   Retention No No   Wax Traps Cerustop Cerustop      Repair Warranty 2027   Loss and Damage Warranty 2027   Fitting Date 2024    Fitting Audiologist Dion Berger CCC-A  Clinical Audiologist         Paired to Phone for phone calls: No  Paired to smart phone application: Yes  Gain Level: 100%  Volume controls active: Yes  Fit to Audiogram performed on 3/7/2024       AIDED TESTING:  Sound field testing was conducted in the aided condition to assess aided responses and benefit achieved with fitted amplification. Responses were obtained in a normal to mild range with both hearing aids.      All testing was performed with recorded speech stimuli at 55 dB HL.     Test Condition PTA SRT   Binaural Hearing Aids 22 dB HL  25 dB HL      Consonant-Nucleus-Consonant (CNC) Word List in Quiet  Test Condition List # Score   Binaural Hearing Aids 1 100%     {Word List:49516}  Test Condition List # Score   Binaural Hearing Aids 1 100%     AzBio Sentences (+10 SNR)  Test Condition List # Score   Binaural Hearing Aids 2 100%       IMPRESSIONS:  We reviewed cleaning and care during today's appointment and he reported good understanding of the topics discussed. He should return as needed for hearing aid checks and annually for audiologic assessments.     He was not charged for today's appointment as he is within the first 90 days of his  fitting.       RECOMMENDATIONS:  1.) Strive for full-time use of the hearing aids except when activities preclude device safety.  2.) Return as needed for hearing aid checks.        Dion Berger, CCC-A  Clinical Audiologist

## 2024-08-02 NOTE — PROGRESS NOTES
UROLOGIC FOLLOW-UP VISIT     PROBLEM LIST:  1. 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 Harris. Dr. Brumfield transferred care to me in December 2022. Patient was tried on myrbetriq and trospium for urgency sx. He has discontinued the trospium mainly due to dry mouth. He states he does feel improvement with the myrbetriq.     He also is on trt and has underwent an IPP placement. He had a PSA recurrence, however, his values have remained stable. He denies any current fevers, chills, nausea, vomiting, unintentional weight loss, increased fatigue or new onset bone pain.     PAST MEDICAL HISTORY:  No past medical history on file.    PAST SURGICAL HISTORY:  Past Surgical History:   Procedure Laterality Date    COLONOSCOPY  11/15/2017    Complete Colonoscopy    OTHER SURGICAL HISTORY  05/31/2017    Atrial Cardioversion    PROSTATECTOMY          ALLERGIES:   No Known Allergies     MEDICATIONS:     Current Outpatient Medications:     allopurinol (Zyloprim) 100 mg tablet, TAKE 1 TABLET DAILY, Disp: 90 tablet, Rfl: 3    alpha tocopherol (Vitamin E) 268 mg (400 unit) capsule, Take 1 capsule (400 Units) by mouth once daily., Disp: , Rfl:     cycloSPORINE (Restasis) 0.05 % ophthalmic emulsion, INSTILL 1 DROP IN BOTH EYES TWICE A DAY, Disp: 180 each, Rfl: 3    escitalopram (Lexapro) 10 mg tablet, Take 1 tablet (10 mg) by mouth once daily., Disp: 90 tablet, Rfl: 3    levothyroxine (Synthroid, Levoxyl) 100 mcg tablet, Take 1 tablet (100 mcg) by mouth once daily., Disp: 90 tablet, Rfl: 3    multivitamin (Multiple Vitamins) tablet, Take 1 tablet by mouth once daily., Disp: , Rfl:     Myrbetriq 50 mg tablet extended release 24 hr 24 hr tablet, Take 1 tablet (50 mg) by mouth once daily., Disp: , Rfl:     Myrbetriq  50 mg tablet extended release 24 hr 24 hr tablet, TAKE 1 TABLET BY MOUTH EVERY DAY, Disp: 30 tablet, Rfl: 2    pravastatin (Pravachol) 40 mg tablet, TAKE 1 TABLET DAILY AT BEDTIME, Disp: 90 tablet, Rfl: 3    rivaroxaban (Xarelto) 20 mg tablet, Take 1 tablet (20 mg) by mouth once daily with breakfast., Disp: 90 tablet, Rfl: 3    testosterone (Fortesta) 10 mg/0.5 gram /actuation gel in metered-dose pump gel, Place 4 Pump on the skin once daily in the morning., Disp: 3 each, Rfl: 3    trospium (Sanctura XR) 60 mg 24 hour capsule, Take 1 capsule (60 mg) by mouth once daily., Disp: 30 capsule, Rfl: 3      SOCIAL HISTORY:  Patient  reports that he has never smoked. He has never used smokeless tobacco. He reports current alcohol use of about 3.0 standard drinks of alcohol per week. He reports that he does not use drugs.   Social History     Socioeconomic History    Marital status:      Spouse name: Not on file    Number of children: Not on file    Years of education: Not on file    Highest education level: Not on file   Occupational History    Not on file   Tobacco Use    Smoking status: Never    Smokeless tobacco: Never   Substance and Sexual Activity    Alcohol use: Yes     Alcohol/week: 3.0 standard drinks of alcohol     Types: 3 Shots of liquor per week     Comment: 4-5 times week    Drug use: Never    Sexual activity: Not on file   Other Topics Concern    Not on file   Social History Narrative    Not on file     Social Determinants of Health     Financial Resource Strain: Not on file   Food Insecurity: Not on file   Transportation Needs: Not on file   Physical Activity: Not on file   Stress: Not on file   Social Connections: Not on file   Intimate Partner Violence: Not on file   Housing Stability: Not on file       FAMILY HISTORY:  Family History   Problem Relation Name Age of Onset    Heart attack Mother      Heart attack Father      Alzheimer's disease Sister         REVIEW OF SYSTEMS:   Constitutional:  Negative for fever and chills. Denies anorexia, weight loss.  Eyes: Negative for visual disturbance.   Respiratory: Negative for shortness of breath.    Cardiovascular: Negative for chest pain.   Gastrointestinal: Negative for nausea and vomiting.   Genitourinary: See interval history above.  Skin: Negative for rash.   Neurological: Negative for dizziness and numbness.   Psychiatric/Behavioral: Negative for confusion and decreased concentration.     PHYSICAL EXAM:  There were no vitals taken for this visit.  Constitutional: Patient appears well-developed and well-nourished. No distress.    Head: Normocephalic and atraumatic.    Neck: Normal range of motion.    Cardiovascular: Normal rate.    Pulmonary/Chest: Effort normal. No respiratory distress.   Abdominal: soft NTND  Musculoskeletal: Normal range of motion.    Neurological: Alert and oriented to person, place, and time.  Psychiatric: Normal mood and affect. Behavior is normal. Thought content normal.        LABORATORY REVIEW:     Lab Results   Component Value Date    BUN 17 02/13/2024    CREATININE 0.84 02/13/2024    EGFR 89 02/13/2024     02/13/2024    K 3.8 02/13/2024     02/13/2024    CO2 27 02/13/2024    CALCIUM 9.2 02/13/2024      Lab Results   Component Value Date    WBC 5.0 02/13/2024    RBC 4.89 02/13/2024    HGB 15.2 02/13/2024    HCT 44.3 02/13/2024    MCV 91 02/13/2024    MCH 31.1 02/13/2024    MCHC 34.3 02/13/2024    RDW 13.3 02/13/2024     02/13/2024        Lab Results   Component Value Date    PSA 0.24 05/31/2024    PSA 0.23 02/26/2024    PSA 0.15 09/25/2023    PSA 0.15 03/01/2023    PSA 0.11 12/07/2022    PSA <0.10 10/08/2021    PSA <0.10 10/23/2019    PSA <0.10 04/15/2019    PSA <0.10 03/12/2019    PSA 6.9 (H) 10/16/2018         Assessment:     1. Prostate cancer (Multi)  PSA      2. Lower urinary tract symptoms (LUTS)  mirabegron (Myrbetriq) 50 mg tablet extended release 24 hr 24 hr tablet           Plan:    Reviewed and  interpreted patient's PSA trend    Will continue myrbetriq, refills sent    Will erform PSA checks every 6 mo   RTC in 6 mo    31 minutes total spent on patient's care today; >50% time spent on counseling/coordination of care

## 2024-08-05 ENCOUNTER — OFFICE VISIT (OUTPATIENT)
Dept: UROLOGY | Facility: HOSPITAL | Age: 79
End: 2024-08-05
Payer: MEDICARE

## 2024-08-05 DIAGNOSIS — R39.9 LOWER URINARY TRACT SYMPTOMS (LUTS): ICD-10-CM

## 2024-08-05 DIAGNOSIS — C61 PROSTATE CANCER (MULTI): Primary | ICD-10-CM

## 2024-08-05 PROCEDURE — 1036F TOBACCO NON-USER: CPT | Performed by: STUDENT IN AN ORGANIZED HEALTH CARE EDUCATION/TRAINING PROGRAM

## 2024-08-05 PROCEDURE — 99214 OFFICE O/P EST MOD 30 MIN: CPT | Performed by: STUDENT IN AN ORGANIZED HEALTH CARE EDUCATION/TRAINING PROGRAM

## 2024-08-05 PROCEDURE — 1159F MED LIST DOCD IN RCRD: CPT | Performed by: STUDENT IN AN ORGANIZED HEALTH CARE EDUCATION/TRAINING PROGRAM

## 2024-08-05 RX ORDER — MIRABEGRON 50 MG/1
50 TABLET, EXTENDED RELEASE ORAL DAILY
Qty: 90 TABLET | Refills: 3 | Status: SHIPPED | OUTPATIENT
Start: 2024-08-05 | End: 2025-07-31

## 2024-08-27 ENCOUNTER — APPOINTMENT (OUTPATIENT)
Dept: PRIMARY CARE | Facility: CLINIC | Age: 79
End: 2024-08-27
Payer: MEDICARE

## 2024-08-27 VITALS
BODY MASS INDEX: 26.91 KG/M2 | HEART RATE: 48 BPM | WEIGHT: 177 LBS | SYSTOLIC BLOOD PRESSURE: 128 MMHG | DIASTOLIC BLOOD PRESSURE: 82 MMHG

## 2024-08-27 DIAGNOSIS — R53.81 MALAISE: ICD-10-CM

## 2024-08-27 DIAGNOSIS — I10 BENIGN ESSENTIAL HYPERTENSION: ICD-10-CM

## 2024-08-27 DIAGNOSIS — E78.2 MIXED HYPERLIPIDEMIA: ICD-10-CM

## 2024-08-27 DIAGNOSIS — C61 ADENOCARCINOMA OF PROSTATE (MULTI): ICD-10-CM

## 2024-08-27 DIAGNOSIS — E78.2 COMBINED HYPERLIPIDEMIA: ICD-10-CM

## 2024-08-27 DIAGNOSIS — Z90.79 HX OF RADICAL PROSTATECTOMY: ICD-10-CM

## 2024-08-27 DIAGNOSIS — E29.1 HYPOGONADISM MALE: Primary | ICD-10-CM

## 2024-08-27 DIAGNOSIS — I26.99 OTHER PULMONARY EMBOLISM WITHOUT ACUTE COR PULMONALE, UNSPECIFIED CHRONICITY (MULTI): ICD-10-CM

## 2024-08-27 DIAGNOSIS — E55.9 VITAMIN D DEFICIENCY: ICD-10-CM

## 2024-08-27 DIAGNOSIS — E03.9 HYPOTHYROIDISM, UNSPECIFIED TYPE: ICD-10-CM

## 2024-08-27 DIAGNOSIS — R41.3 MEMORY CHANGE: ICD-10-CM

## 2024-08-27 PROCEDURE — 1124F ACP DISCUSS-NO DSCNMKR DOCD: CPT | Performed by: INTERNAL MEDICINE

## 2024-08-27 PROCEDURE — 1160F RVW MEDS BY RX/DR IN RCRD: CPT | Performed by: INTERNAL MEDICINE

## 2024-08-27 PROCEDURE — 1036F TOBACCO NON-USER: CPT | Performed by: INTERNAL MEDICINE

## 2024-08-27 PROCEDURE — 1159F MED LIST DOCD IN RCRD: CPT | Performed by: INTERNAL MEDICINE

## 2024-08-27 PROCEDURE — 1126F AMNT PAIN NOTED NONE PRSNT: CPT | Performed by: INTERNAL MEDICINE

## 2024-08-27 PROCEDURE — 3074F SYST BP LT 130 MM HG: CPT | Performed by: INTERNAL MEDICINE

## 2024-08-27 PROCEDURE — G2211 COMPLEX E/M VISIT ADD ON: HCPCS | Performed by: INTERNAL MEDICINE

## 2024-08-27 PROCEDURE — 3079F DIAST BP 80-89 MM HG: CPT | Performed by: INTERNAL MEDICINE

## 2024-08-27 PROCEDURE — 99214 OFFICE O/P EST MOD 30 MIN: CPT | Performed by: INTERNAL MEDICINE

## 2024-08-27 SDOH — HEALTH STABILITY: PHYSICAL HEALTH: ON AVERAGE, HOW MANY DAYS PER WEEK DO YOU ENGAGE IN MODERATE TO STRENUOUS EXERCISE (LIKE A BRISK WALK)?: 5 DAYS

## 2024-08-27 SDOH — HEALTH STABILITY: PHYSICAL HEALTH: ON AVERAGE, HOW MANY MINUTES DO YOU ENGAGE IN EXERCISE AT THIS LEVEL?: 30 MIN

## 2024-08-27 ASSESSMENT — ENCOUNTER SYMPTOMS
NECK PAIN: 0
NAUSEA: 0
FATIGUE: 1
SINUS PRESSURE: 0
ARTHRALGIAS: 0
DYSURIA: 0
SLEEP DISTURBANCE: 0
CHOKING: 0
LIGHT-HEADEDNESS: 0
TREMORS: 0
MYALGIAS: 0
SEIZURES: 0
NUMBNESS: 0
ANAL BLEEDING: 0
FACIAL ASYMMETRY: 0
BLOOD IN STOOL: 0
ABDOMINAL DISTENTION: 0
WHEEZING: 0
SINUS PAIN: 0
CHILLS: 0
COUGH: 0
RHINORRHEA: 0
POLYPHAGIA: 0
JOINT SWELLING: 0
TROUBLE SWALLOWING: 0
EYE REDNESS: 0
ACTIVITY CHANGE: 0
DIZZINESS: 0
EYE PAIN: 0
STRIDOR: 0
RECTAL PAIN: 0
FACIAL SWELLING: 0
WOUND: 0
PHOTOPHOBIA: 0
COLOR CHANGE: 0
POLYDIPSIA: 0
ADENOPATHY: 0
HEMATURIA: 0
DIARRHEA: 0
SHORTNESS OF BREATH: 0
APPETITE CHANGE: 0
FLANK PAIN: 0
BRUISES/BLEEDS EASILY: 0
SORE THROAT: 0
DIFFICULTY URINATING: 0
VOMITING: 0
FREQUENCY: 0
DIAPHORESIS: 0
EYE ITCHING: 0
SPEECH DIFFICULTY: 0
EYE DISCHARGE: 0
ABDOMINAL PAIN: 0
VOICE CHANGE: 0
WEAKNESS: 0
HEADACHES: 0
CONSTIPATION: 0
PALPITATIONS: 0
NECK STIFFNESS: 0
CHEST TIGHTNESS: 0
BACK PAIN: 0

## 2024-08-27 ASSESSMENT — LIFESTYLE VARIABLES
SKIP TO QUESTIONS 9-10: 1
HOW OFTEN DO YOU HAVE A DRINK CONTAINING ALCOHOL: 4 OR MORE TIMES A WEEK
AUDIT-C TOTAL SCORE: 4
HOW MANY STANDARD DRINKS CONTAINING ALCOHOL DO YOU HAVE ON A TYPICAL DAY: 1 OR 2
HOW OFTEN DO YOU HAVE SIX OR MORE DRINKS ON ONE OCCASION: NEVER

## 2024-08-27 ASSESSMENT — PAIN SCALES - GENERAL: PAINLEVEL: 0-NO PAIN

## 2024-08-27 ASSESSMENT — PATIENT HEALTH QUESTIONNAIRE - PHQ9
2. FEELING DOWN, DEPRESSED OR HOPELESS: NOT AT ALL
SUM OF ALL RESPONSES TO PHQ9 QUESTIONS 1 AND 2: 0
1. LITTLE INTEREST OR PLEASURE IN DOING THINGS: NOT AT ALL

## 2024-08-27 NOTE — PROGRESS NOTES
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    Patient presents for follow-up.  He has been compliant with his medications, diet and exercise.  He has been complaining of fatigue early in the morning.  He reports it takes a little longer to get down.  He denies any nonrestorative sleep.  He denies any snoring.  He reports that the symptoms improved throughout the day.  He denies any headaches, no dizziness but does complain of allergy symptoms.  He denies any chest pain or shortness of breath, no abdominal pain no nausea vomiting or diarrhea.  He reports no new musculoskeletal complaints..  He reports that his memory loss is at baseline.         Review of Systems   Constitutional:  Positive for fatigue. Negative for activity change, appetite change, chills and diaphoresis.   HENT:  Negative for congestion, dental problem, drooling, ear discharge, ear pain, facial swelling, hearing loss, mouth sores, nosebleeds, postnasal drip, rhinorrhea, sinus pressure, sinus pain, sneezing, sore throat, tinnitus, trouble swallowing and voice change.    Eyes:  Negative for photophobia, pain, discharge, redness, itching and visual disturbance.   Respiratory:  Negative for cough, choking, chest tightness, shortness of breath, wheezing and stridor.    Cardiovascular:  Negative for chest pain, palpitations and leg swelling.   Gastrointestinal:  Negative for abdominal distention, abdominal pain, anal bleeding, blood in stool, constipation, diarrhea, nausea, rectal pain and vomiting.   Endocrine: Negative for cold intolerance, heat intolerance, polydipsia, polyphagia and polyuria.   Genitourinary:  Negative for decreased urine volume, difficulty urinating, dysuria, enuresis, flank pain, frequency, genital sores, hematuria and urgency.   Musculoskeletal:  Negative for arthralgias, back pain, gait problem, joint swelling, myalgias, neck pain and neck stiffness.   Skin:  Negative for color  change, pallor, rash and wound.   Neurological:  Negative for dizziness, tremors, seizures, syncope, facial asymmetry, speech difficulty, weakness, light-headedness, numbness and headaches.   Hematological:  Negative for adenopathy. Does not bruise/bleed easily.   Psychiatric/Behavioral:  Negative for sleep disturbance.        Objective   /82   Pulse (!) 48   Wt 80.3 kg (177 lb)   BMI 26.91 kg/m²     Physical Exam  Constitutional:       Appearance: Normal appearance.   Cardiovascular:      Rate and Rhythm: Normal rate and regular rhythm.      Heart sounds: No murmur heard.     No gallop.   Pulmonary:      Effort: No respiratory distress.      Breath sounds: No wheezing or rales.   Abdominal:      General: There is no distension.      Palpations: There is no mass.      Tenderness: There is no abdominal tenderness. There is no guarding.   Musculoskeletal:      Right lower leg: No edema.      Left lower leg: No edema.   Neurological:      Mental Status: He is alert.         Assessment/Plan   Diagnoses and all orders for this visit:  Hypogonadism male-will check a.m. testosterone level.  Risk of testosterone replacement and prostate cancer explained.  -     Testosterone, total and free; Future  Benign essential hypertension-stable off of medication  -     Albumin-Creatinine Ratio, Urine Random; Future  -     Comprehensive Metabolic Panel; Future  -     Uric Acid; Future  -     Urinalysis with Reflex Microscopic; Future  Combined hyperlipidemia-check a lipid profile.  Malaise/fatigue-will check routine labs.  He does not want to consider a sleep study at this point.  -     CBC and Auto Differential; Future  -     TSH with reflex to Free T4 if abnormal; Future  Vitamin D deficiency  -     Vitamin D 25-Hydroxy,Total (for eval of Vitamin D levels); Future  Mixed hyperlipidemia-check lipid profile  -     Lipid Panel; Future  Other pulmonary embolism without acute cor pulmonale, unspecified chronicity (Multi)-will  continue with anticoagulation  Hypothyroidism, unspecified type-check TSH-  Hx of radical prostatectomy  Adenocarcinoma of prostate (Multi)-follow-up with urology  Memory change  Health maintenance-Cologuard has been done.  He will schedule an eye appointment.  Will get a flu shot next month.  Bradycardia-will schedule follow-up with cardiology.  Could be contributing to fatigue.

## 2024-08-27 NOTE — PATIENT INSTRUCTIONS
Please take medication as prescribed.  Follow-up in 3 months.  Obtain fasting blood work and urine.

## 2024-08-29 ENCOUNTER — LAB (OUTPATIENT)
Dept: LAB | Facility: LAB | Age: 79
End: 2024-08-29
Payer: MEDICARE

## 2024-08-29 DIAGNOSIS — E29.1 HYPOGONADISM MALE: ICD-10-CM

## 2024-08-29 DIAGNOSIS — R53.81 MALAISE: ICD-10-CM

## 2024-08-29 DIAGNOSIS — I10 BENIGN ESSENTIAL HYPERTENSION: ICD-10-CM

## 2024-08-29 DIAGNOSIS — E55.9 VITAMIN D DEFICIENCY: ICD-10-CM

## 2024-08-29 DIAGNOSIS — E78.2 MIXED HYPERLIPIDEMIA: ICD-10-CM

## 2024-08-29 LAB
25(OH)D3 SERPL-MCNC: 35 NG/ML (ref 30–100)
ALBUMIN SERPL BCP-MCNC: 4.1 G/DL (ref 3.4–5)
ALP SERPL-CCNC: 57 U/L (ref 33–136)
ALT SERPL W P-5'-P-CCNC: 16 U/L (ref 10–52)
ANION GAP SERPL CALC-SCNC: 13 MMOL/L (ref 10–20)
APPEARANCE UR: CLEAR
AST SERPL W P-5'-P-CCNC: 29 U/L (ref 9–39)
BASOPHILS # BLD AUTO: 0.02 X10*3/UL (ref 0–0.1)
BASOPHILS NFR BLD AUTO: 0.6 %
BILIRUB SERPL-MCNC: 0.5 MG/DL (ref 0–1.2)
BILIRUB UR STRIP.AUTO-MCNC: NEGATIVE MG/DL
BUN SERPL-MCNC: 12 MG/DL (ref 6–23)
CALCIUM SERPL-MCNC: 9.7 MG/DL (ref 8.6–10.6)
CHLORIDE SERPL-SCNC: 104 MMOL/L (ref 98–107)
CHOLEST SERPL-MCNC: 159 MG/DL (ref 0–199)
CHOLESTEROL/HDL RATIO: 4.1
CO2 SERPL-SCNC: 28 MMOL/L (ref 21–32)
COLOR UR: NORMAL
CREAT SERPL-MCNC: 0.79 MG/DL (ref 0.5–1.3)
CREAT UR-MCNC: 127.4 MG/DL (ref 20–370)
EGFRCR SERPLBLD CKD-EPI 2021: 90 ML/MIN/1.73M*2
EOSINOPHIL # BLD AUTO: 0.07 X10*3/UL (ref 0–0.4)
EOSINOPHIL NFR BLD AUTO: 2.2 %
ERYTHROCYTE [DISTWIDTH] IN BLOOD BY AUTOMATED COUNT: 12.7 % (ref 11.5–14.5)
GLUCOSE SERPL-MCNC: 94 MG/DL (ref 74–99)
GLUCOSE UR STRIP.AUTO-MCNC: NORMAL MG/DL
HCT VFR BLD AUTO: 44.4 % (ref 41–52)
HDLC SERPL-MCNC: 39.2 MG/DL
HGB BLD-MCNC: 15.1 G/DL (ref 13.5–17.5)
IMM GRANULOCYTES # BLD AUTO: 0 X10*3/UL (ref 0–0.5)
IMM GRANULOCYTES NFR BLD AUTO: 0 % (ref 0–0.9)
KETONES UR STRIP.AUTO-MCNC: NEGATIVE MG/DL
LDLC SERPL CALC-MCNC: 99 MG/DL
LEUKOCYTE ESTERASE UR QL STRIP.AUTO: NEGATIVE
LYMPHOCYTES # BLD AUTO: 1.24 X10*3/UL (ref 0.8–3)
LYMPHOCYTES NFR BLD AUTO: 38.3 %
MCH RBC QN AUTO: 30.8 PG (ref 26–34)
MCHC RBC AUTO-ENTMCNC: 34 G/DL (ref 32–36)
MCV RBC AUTO: 91 FL (ref 80–100)
MICROALBUMIN UR-MCNC: <7 MG/L
MICROALBUMIN/CREAT UR: NORMAL MG/G{CREAT}
MONOCYTES # BLD AUTO: 0.31 X10*3/UL (ref 0.05–0.8)
MONOCYTES NFR BLD AUTO: 9.6 %
NEUTROPHILS # BLD AUTO: 1.6 X10*3/UL (ref 1.6–5.5)
NEUTROPHILS NFR BLD AUTO: 49.3 %
NITRITE UR QL STRIP.AUTO: NEGATIVE
NON HDL CHOLESTEROL: 120 MG/DL (ref 0–149)
NRBC BLD-RTO: 0 /100 WBCS (ref 0–0)
PH UR STRIP.AUTO: 6.5 [PH]
PLATELET # BLD AUTO: 185 X10*3/UL (ref 150–450)
POTASSIUM SERPL-SCNC: 4.2 MMOL/L (ref 3.5–5.3)
PROT SERPL-MCNC: 6.7 G/DL (ref 6.4–8.2)
PROT UR STRIP.AUTO-MCNC: NEGATIVE MG/DL
RBC # BLD AUTO: 4.9 X10*6/UL (ref 4.5–5.9)
RBC # UR STRIP.AUTO: NEGATIVE /UL
SODIUM SERPL-SCNC: 141 MMOL/L (ref 136–145)
SP GR UR STRIP.AUTO: 1.01
TRIGL SERPL-MCNC: 102 MG/DL (ref 0–149)
TSH SERPL-ACNC: 0.97 MIU/L (ref 0.44–3.98)
URATE SERPL-MCNC: 5 MG/DL (ref 4–7.5)
UROBILINOGEN UR STRIP.AUTO-MCNC: NORMAL MG/DL
VLDL: 20 MG/DL (ref 0–40)
WBC # BLD AUTO: 3.2 X10*3/UL (ref 4.4–11.3)

## 2024-08-29 PROCEDURE — 84443 ASSAY THYROID STIM HORMONE: CPT

## 2024-08-29 PROCEDURE — 81003 URINALYSIS AUTO W/O SCOPE: CPT

## 2024-08-29 PROCEDURE — 36415 COLL VENOUS BLD VENIPUNCTURE: CPT

## 2024-08-29 PROCEDURE — 85025 COMPLETE CBC W/AUTO DIFF WBC: CPT

## 2024-08-29 PROCEDURE — 80061 LIPID PANEL: CPT

## 2024-08-29 PROCEDURE — 80053 COMPREHEN METABOLIC PANEL: CPT

## 2024-08-29 PROCEDURE — 82306 VITAMIN D 25 HYDROXY: CPT

## 2024-08-29 PROCEDURE — 82570 ASSAY OF URINE CREATININE: CPT

## 2024-08-29 PROCEDURE — 82043 UR ALBUMIN QUANTITATIVE: CPT

## 2024-08-29 PROCEDURE — 84550 ASSAY OF BLOOD/URIC ACID: CPT

## 2024-08-29 PROCEDURE — 84402 ASSAY OF FREE TESTOSTERONE: CPT

## 2024-09-03 LAB
TESTOSTERONE FREE (CHAN): 38.5 PG/ML (ref 30–135)
TESTOSTERONE,TOTAL,LC-MS/MS: 283 NG/DL (ref 250–1100)

## 2024-09-16 ENCOUNTER — CLINICAL SUPPORT (OUTPATIENT)
Dept: AUDIOLOGY | Facility: CLINIC | Age: 79
End: 2024-09-16
Payer: MEDICARE

## 2024-09-16 DIAGNOSIS — H90.3 BILATERAL SENSORINEURAL HEARING LOSS: Primary | ICD-10-CM

## 2024-09-16 PROCEDURE — V5299 HEARING SERVICE: HCPCS | Mod: AUDSP

## 2024-09-17 NOTE — PROGRESS NOTES
ADULT HEARING AID CHECK      Name:  Arturo Brumfield MD   :  1945   Age:  79 y.o.   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 He noted that his left hearing aid stopped working on Friday. He tried to increase his volume and did not appreciate any changes in output.       HEARING AIDS:  Listening check revealed no output from the left hearing aid and weak output from the right hearing aid. Visual inspection revealed occluded wax traps, left more than right. His hearing aids were cleaned; the mics were brushes, the wax traps were replaced, and his domes were changed. Following cleaning, the hearing aids were in good working condition.     Hearing Aid Information Right Left    Phonak Phonak   Model Audéo L70-R Audéo L70-R   Serial Number [id number]   /Tubing 1 M 1 M   Dome Small Vented Small Vented   Retention No No   Wax Traps Cerustop Cerustop      Repair Warranty 2027   Loss and Damage Warranty 2027   Fitting Date 2024    Fitting Audiologist Dion Berger CCC-A  Clinical Audiologist         Paired to Phone for phone calls: No  Paired to smart phone application: Yes  Gain Level: 100%  Volume controls active: Yes  Fit to Audiogram performed on 3/7/2024      IMPRESSIONS:  We reviewed cleaning and care. I demonstrated how to change his wax traps when the devices sound dull, weak, or like they've stopped working. I recommend he replace his wax traps once a month for both hearing aids. He reported good understanding.     He paid for his hearing aid check at the .       RECOMMENDATIONS:  1.) Return as needed for hearing aid clean and checks.  2.) Return for annual audiologic assessments.   3.) Continue full-time use of the hearing aids.       Dion Berger CCC-A  Clinical Audiologist

## 2024-12-10 DIAGNOSIS — E78.2 COMBINED HYPERLIPIDEMIA: ICD-10-CM

## 2024-12-10 DIAGNOSIS — M10.9 ACUTE GOUT, UNSPECIFIED CAUSE, UNSPECIFIED SITE: ICD-10-CM

## 2024-12-10 RX ORDER — PRAVASTATIN SODIUM 40 MG/1
40 TABLET ORAL NIGHTLY
Qty: 90 TABLET | Refills: 3 | Status: SHIPPED | OUTPATIENT
Start: 2024-12-10

## 2024-12-10 RX ORDER — ALLOPURINOL 100 MG/1
100 TABLET ORAL DAILY
Qty: 90 TABLET | Refills: 3 | Status: SHIPPED | OUTPATIENT
Start: 2024-12-10

## 2025-01-06 ENCOUNTER — APPOINTMENT (OUTPATIENT)
Dept: OPHTHALMOLOGY | Age: 80
End: 2025-01-06
Payer: MEDICARE

## 2025-01-06 DIAGNOSIS — H25.813 COMBINED FORMS OF AGE-RELATED CATARACT OF BOTH EYES: Primary | ICD-10-CM

## 2025-01-06 DIAGNOSIS — H40.003 GLAUCOMA SUSPECT OF BOTH EYES: ICD-10-CM

## 2025-01-06 PROCEDURE — 92134 CPTRZ OPH DX IMG PST SGM RTA: CPT | Performed by: OPHTHALMOLOGY

## 2025-01-06 PROCEDURE — 92136 OPHTHALMIC BIOMETRY: CPT | Performed by: OPHTHALMOLOGY

## 2025-01-06 PROCEDURE — 92136 OPHTHALMIC BIOMETRY: CPT | Mod: BILATERAL PROCEDURE | Performed by: OPHTHALMOLOGY

## 2025-01-06 PROCEDURE — 99214 OFFICE O/P EST MOD 30 MIN: CPT | Performed by: OPHTHALMOLOGY

## 2025-01-06 PROCEDURE — 76514 ECHO EXAM OF EYE THICKNESS: CPT | Performed by: OPHTHALMOLOGY

## 2025-01-06 RX ORDER — PHENYLEPHRINE HYDROCHLORIDE 25 MG/ML
1 SOLUTION/ DROPS OPHTHALMIC
OUTPATIENT
Start: 2025-01-06 | End: 2025-01-06

## 2025-01-06 RX ORDER — MOXIFLOXACIN 5 MG/ML
1 SOLUTION/ DROPS OPHTHALMIC
OUTPATIENT
Start: 2025-01-06 | End: 2025-01-06

## 2025-01-06 RX ORDER — CYCLOPENTOLATE HYDROCHLORIDE 10 MG/ML
1 SOLUTION/ DROPS OPHTHALMIC
OUTPATIENT
Start: 2025-01-06 | End: 2025-01-06

## 2025-01-06 RX ORDER — TETRACAINE HYDROCHLORIDE 5 MG/ML
1 SOLUTION OPHTHALMIC ONCE
OUTPATIENT
Start: 2025-01-06 | End: 2025-01-06

## 2025-01-06 RX ORDER — TROPICAMIDE 10 MG/ML
1 SOLUTION/ DROPS OPHTHALMIC
OUTPATIENT
Start: 2025-01-06 | End: 2025-01-06

## 2025-01-06 ASSESSMENT — VISUAL ACUITY
OD_CC: 20/50
OD_PH_CC+: -2
OD_BAT_MED: 20/150
OS_CC: 20/50
OS_PH_CC: 20/40
OD_PH_CC: 20/40
METHOD: SNELLEN - LINEAR
OS_PH_CC+: -2
OS_BAT_MED: 20/80
OD_CC+: +1
CORRECTION_TYPE: GLASSES
OS_CC+: +1

## 2025-01-06 ASSESSMENT — REFRACTION_WEARINGRX
OS_AXIS: 073
OD_AXIS: 115
OS_ADD: +2.25
SPECS_TYPE: PAL
OD_SPHERE: +2.00
OS_SPHERE: +2.00
OD_ADD: +2.25

## 2025-01-06 ASSESSMENT — TONOMETRY
IOP_METHOD: TONOPEN
OD_IOP_MMHG: 11
OS_IOP_MMHG: 11

## 2025-01-06 ASSESSMENT — ENCOUNTER SYMPTOMS
ALLERGIC/IMMUNOLOGIC NEGATIVE: 0
HEMATOLOGIC/LYMPHATIC NEGATIVE: 0
EYES NEGATIVE: 1
ENDOCRINE NEGATIVE: 0
CARDIOVASCULAR NEGATIVE: 0
MUSCULOSKELETAL NEGATIVE: 0
NEUROLOGICAL NEGATIVE: 0
GASTROINTESTINAL NEGATIVE: 0
CONSTITUTIONAL NEGATIVE: 0
RESPIRATORY NEGATIVE: 0
PSYCHIATRIC NEGATIVE: 0

## 2025-01-06 ASSESSMENT — CONF VISUAL FIELD
OS_SUPERIOR_TEMPORAL_RESTRICTION: 0
OD_INFERIOR_TEMPORAL_RESTRICTION: 0
OS_INFERIOR_NASAL_RESTRICTION: 0
OD_NORMAL: 1
OS_SUPERIOR_NASAL_RESTRICTION: 0
OD_SUPERIOR_TEMPORAL_RESTRICTION: 0
OS_INFERIOR_TEMPORAL_RESTRICTION: 0
OD_SUPERIOR_NASAL_RESTRICTION: 0
OS_NORMAL: 1
OD_INFERIOR_NASAL_RESTRICTION: 0

## 2025-01-06 ASSESSMENT — REFRACTION_MANIFEST
OS_CYLINDER: -0.50
METHOD_AUTOREFRACTION: 1
OD_SPHERE: +1.25
OS_AXIS: 044
OD_CYLINDER: -0.25
OS_SPHERE: +0.75
OD_AXIS: 134

## 2025-01-06 ASSESSMENT — EXTERNAL EXAM - RIGHT EYE: OD_EXAM: NORMAL

## 2025-01-06 ASSESSMENT — PACHYMETRY
OS_CT(UM): 504
OD_CT(UM): 484

## 2025-01-06 ASSESSMENT — SLIT LAMP EXAM - LIDS
COMMENTS: GOOD POSITION
COMMENTS: GOOD POSITION

## 2025-01-06 ASSESSMENT — CUP TO DISC RATIO
OS_RATIO: .45
OD_RATIO: .45

## 2025-01-06 ASSESSMENT — EXTERNAL EXAM - LEFT EYE: OS_EXAM: NORMAL

## 2025-01-06 NOTE — PROGRESS NOTES
1/6/2025 CC: 79 y.o. presents for cataract and glaucoma evaluation.    Past ocular history: glaucoma suspect  Family history: no family history of glaucoma   Past medical history: HTN, hypothyroidism, prostate cancer, others per chart   Social history: denies tobacco     Eye medications:   Both eyes: Restasis bid, Ats PRN    Allergy:  NKDA    Testing:    OCT 1/6/2025:  OD- full, avg 93. OS- full, avg 88 um. GCA: full OU. Mac: Regular macular contour, /252    Pachymetry 1/6/2025:  484/504    Gonioscopy: open OU    Tmax: mid- teens    Assessment:   NS cataract  - VS OD>OS  - Peribulbar block (blood thinner)  - Combined forms of age-related cataract of right eyeH25.811  Visually significant. Pt would like to proceed with surgery.    Visually significant cataract OD. BCVA: 20/50. Glare: 20/150. Symptoms: blurry vision, glare. A change in glasses prescription will not result in significant visual improvement at this time.  Indication for cataract surgery: Input To potentially improve visual acuity and improve quality of life/reduce symptoms.   Based on a comprehensive eye exam performed today, a visually significant cataract appears to be the source of decreased vision, diminished quality of life, and impairment of activities of daily living. Discussed option of cataract surgery vs observation. Patient can no longer function adequately with current best corrected visual acuity and wishes to have cataract surgery at this time. Discussed surgical procedure with patient. As a result of cataract extraction, it is believed that the patient will experience improved vision. Discussed potential risks, benefits, and complications of cataract surgery including but not limited to pain, bleeding, infection, inflammation, edema, increased eye pressure, retinal tear/detachment, lens dislocation, ptosis, iris damage, need for additional surgery, need for glasses after surgery, loss of vision/loss of eye. Patient understands and  wishes to proceed. All questions were answered. Will schedule cataract surgery OD. Lenstar done today.  Discussed IOL options (standard monofocal, monofocal with monovision, toric, multifocal). Lens chosen: standard monofocal. Defer/decline toric/multifocal lens at this time. Had thorough discussion with patient re: aim. Discussed that may potentially need glasses for best vision both at distance and at near.    I personally reviewed the lenstar measurements and will choose the lens accordingly.    Schedule cataract surgery OD: DIB00 +22.0, target plano. OS: DIB00 +21.5, target plano.       Glaucoma suspect  - GENE 2/2022, Dr Joe  - Negative fhx  - Thinner C  - CDR 0.5  - Testing: wnl    REGINA  - Using Restasis/Ats    Plan:   I explained my findings. GS, CTM. VS cataract, schedule for OD surgery     Eye medications:   Both eyes: Continue Restasis/Ats    RTC POD1 cataract OD

## 2025-01-18 DIAGNOSIS — E29.1 HYPOGONADISM MALE: ICD-10-CM

## 2025-01-20 DIAGNOSIS — R32 URINARY INCONTINENCE, UNSPECIFIED TYPE: ICD-10-CM

## 2025-01-20 RX ORDER — TESTOSTERONE 10 MG/.5G
4 GEL, METERED TOPICAL EVERY MORNING
Qty: 120 G | Refills: 3 | Status: SHIPPED | OUTPATIENT
Start: 2025-01-20

## 2025-01-21 DIAGNOSIS — E03.9 HYPOTHYROIDISM, UNSPECIFIED TYPE: Primary | ICD-10-CM

## 2025-01-21 RX ORDER — MIRABEGRON 50 MG/1
50 TABLET, FILM COATED, EXTENDED RELEASE ORAL DAILY
Qty: 30 TABLET | Refills: 2 | Status: SHIPPED | OUTPATIENT
Start: 2025-01-21

## 2025-01-22 ENCOUNTER — LAB (OUTPATIENT)
Dept: LAB | Facility: LAB | Age: 80
End: 2025-01-22
Payer: MEDICARE

## 2025-01-22 DIAGNOSIS — E03.9 HYPOTHYROIDISM, UNSPECIFIED TYPE: ICD-10-CM

## 2025-01-22 LAB — TSH SERPL-ACNC: 0.68 MIU/L (ref 0.44–3.98)

## 2025-01-22 PROCEDURE — 84443 ASSAY THYROID STIM HORMONE: CPT

## 2025-01-23 ENCOUNTER — ANESTHESIA EVENT (OUTPATIENT)
Dept: OPERATING ROOM | Facility: CLINIC | Age: 80
End: 2025-01-23
Payer: MEDICARE

## 2025-02-03 DIAGNOSIS — F32.A DEPRESSION, UNSPECIFIED DEPRESSION TYPE: ICD-10-CM

## 2025-02-03 DIAGNOSIS — E03.9 HYPOTHYROIDISM, UNSPECIFIED TYPE: ICD-10-CM

## 2025-02-03 RX ORDER — LEVOTHYROXINE SODIUM 100 UG/1
100 TABLET ORAL DAILY
Qty: 90 TABLET | Refills: 3 | Status: SHIPPED | OUTPATIENT
Start: 2025-02-03

## 2025-02-03 RX ORDER — ESCITALOPRAM OXALATE 10 MG/1
10 TABLET ORAL DAILY
Qty: 90 TABLET | Refills: 3 | Status: SHIPPED | OUTPATIENT
Start: 2025-02-03

## 2025-02-07 DIAGNOSIS — Z98.41 CATARACT EXTRACTION STATUS OF RIGHT EYE: Primary | ICD-10-CM

## 2025-02-07 PROCEDURE — RXMED WILLOW AMBULATORY MEDICATION CHARGE

## 2025-02-07 RX ORDER — PREDNISOLONE ACETATE 10 MG/ML
1 SUSPENSION/ DROPS OPHTHALMIC 4 TIMES DAILY
COMMUNITY
End: 2025-02-07 | Stop reason: SDUPTHER

## 2025-02-07 RX ORDER — MOXIFLOXACIN 5 MG/ML
1 SOLUTION/ DROPS OPHTHALMIC 4 TIMES DAILY
Qty: 3 ML | Refills: 0 | Status: SHIPPED | OUTPATIENT
Start: 2025-02-07

## 2025-02-07 RX ORDER — MOXIFLOXACIN 5 MG/ML
1 SOLUTION/ DROPS OPHTHALMIC 4 TIMES DAILY
COMMUNITY
End: 2025-02-07 | Stop reason: SDUPTHER

## 2025-02-07 RX ORDER — PREDNISOLONE ACETATE 10 MG/ML
1 SUSPENSION/ DROPS OPHTHALMIC 4 TIMES DAILY
Qty: 5 ML | Refills: 0 | Status: SHIPPED | OUTPATIENT
Start: 2025-02-07

## 2025-02-10 ENCOUNTER — HOSPITAL ENCOUNTER (OUTPATIENT)
Facility: CLINIC | Age: 80
Setting detail: OUTPATIENT SURGERY
Discharge: HOME | End: 2025-02-10
Attending: OPHTHALMOLOGY | Admitting: OPHTHALMOLOGY
Payer: MEDICARE

## 2025-02-10 ENCOUNTER — ANESTHESIA (OUTPATIENT)
Dept: OPERATING ROOM | Facility: CLINIC | Age: 80
End: 2025-02-10
Payer: MEDICARE

## 2025-02-10 ENCOUNTER — PHARMACY VISIT (OUTPATIENT)
Dept: PHARMACY | Facility: CLINIC | Age: 80
End: 2025-02-10
Payer: COMMERCIAL

## 2025-02-10 VITALS
HEIGHT: 69 IN | TEMPERATURE: 98.1 F | HEART RATE: 50 BPM | SYSTOLIC BLOOD PRESSURE: 120 MMHG | RESPIRATION RATE: 16 BRPM | DIASTOLIC BLOOD PRESSURE: 71 MMHG | BODY MASS INDEX: 26.71 KG/M2 | OXYGEN SATURATION: 99 % | WEIGHT: 180.34 LBS

## 2025-02-10 DIAGNOSIS — H25.813 COMBINED FORMS OF AGE-RELATED CATARACT OF BOTH EYES: Primary | ICD-10-CM

## 2025-02-10 PROCEDURE — 3700000001 HC GENERAL ANESTHESIA TIME - INITIAL BASE CHARGE: Performed by: OPHTHALMOLOGY

## 2025-02-10 PROCEDURE — 2720000007 HC OR 272 NO HCPCS: Performed by: OPHTHALMOLOGY

## 2025-02-10 PROCEDURE — 2500000004 HC RX 250 GENERAL PHARMACY W/ HCPCS (ALT 636 FOR OP/ED): Performed by: OPHTHALMOLOGY

## 2025-02-10 PROCEDURE — A66984 PR REMV CATARACT EXTRACAP,INSERT LENS: Performed by: ANESTHESIOLOGY

## 2025-02-10 PROCEDURE — 3600000008 HC OR TIME - EACH INCREMENTAL 1 MINUTE - PROCEDURE LEVEL THREE: Performed by: OPHTHALMOLOGY

## 2025-02-10 PROCEDURE — 7100000009 HC PHASE TWO TIME - INITIAL BASE CHARGE: Performed by: OPHTHALMOLOGY

## 2025-02-10 PROCEDURE — 2500000005 HC RX 250 GENERAL PHARMACY W/O HCPCS: Performed by: OPHTHALMOLOGY

## 2025-02-10 PROCEDURE — 2500000004 HC RX 250 GENERAL PHARMACY W/ HCPCS (ALT 636 FOR OP/ED): Performed by: ANESTHESIOLOGY

## 2025-02-10 PROCEDURE — 3600000003 HC OR TIME - INITIAL BASE CHARGE - PROCEDURE LEVEL THREE: Performed by: OPHTHALMOLOGY

## 2025-02-10 PROCEDURE — 7100000010 HC PHASE TWO TIME - EACH INCREMENTAL 1 MINUTE: Performed by: OPHTHALMOLOGY

## 2025-02-10 PROCEDURE — V2632 POST CHMBR INTRAOCULAR LENS: HCPCS | Performed by: OPHTHALMOLOGY

## 2025-02-10 PROCEDURE — 2760000001 HC OR 276 NO HCPCS: Performed by: OPHTHALMOLOGY

## 2025-02-10 PROCEDURE — 3700000002 HC GENERAL ANESTHESIA TIME - EACH INCREMENTAL 1 MINUTE: Performed by: OPHTHALMOLOGY

## 2025-02-10 PROCEDURE — 99100 ANES PT EXTEME AGE<1 YR&>70: CPT | Performed by: ANESTHESIOLOGY

## 2025-02-10 PROCEDURE — 66984 XCAPSL CTRC RMVL W/O ECP: CPT | Performed by: OPHTHALMOLOGY

## 2025-02-10 RX ORDER — TETRACAINE HYDROCHLORIDE 5 MG/ML
SOLUTION OPHTHALMIC AS NEEDED
Status: DISCONTINUED | OUTPATIENT
Start: 2025-02-10 | End: 2025-02-10 | Stop reason: HOSPADM

## 2025-02-10 RX ORDER — TETRACAINE HYDROCHLORIDE 5 MG/ML
1 SOLUTION OPHTHALMIC ONCE
Status: COMPLETED | OUTPATIENT
Start: 2025-02-10 | End: 2025-02-10

## 2025-02-10 RX ORDER — PHENYLEPHRINE HYDROCHLORIDE 25 MG/ML
1 SOLUTION/ DROPS OPHTHALMIC
Status: COMPLETED | OUTPATIENT
Start: 2025-02-10 | End: 2025-02-10

## 2025-02-10 RX ORDER — SODIUM CHLORIDE, SODIUM LACTATE, POTASSIUM CHLORIDE, CALCIUM CHLORIDE 600; 310; 30; 20 MG/100ML; MG/100ML; MG/100ML; MG/100ML
100 INJECTION, SOLUTION INTRAVENOUS CONTINUOUS
Status: CANCELLED | OUTPATIENT
Start: 2025-02-10 | End: 2025-02-10

## 2025-02-10 RX ORDER — ONDANSETRON HYDROCHLORIDE 2 MG/ML
4 INJECTION, SOLUTION INTRAVENOUS ONCE AS NEEDED
Status: DISCONTINUED | OUTPATIENT
Start: 2025-02-10 | End: 2025-02-10 | Stop reason: HOSPADM

## 2025-02-10 RX ORDER — MOXIFLOXACIN 5 MG/ML
1 SOLUTION/ DROPS OPHTHALMIC
Status: COMPLETED | OUTPATIENT
Start: 2025-02-10 | End: 2025-02-10

## 2025-02-10 RX ORDER — OXYCODONE HYDROCHLORIDE 5 MG/1
5 TABLET ORAL EVERY 4 HOURS PRN
Status: CANCELLED | OUTPATIENT
Start: 2025-02-10

## 2025-02-10 RX ORDER — ACETAMINOPHEN 325 MG/1
650 TABLET ORAL EVERY 4 HOURS PRN
Status: DISCONTINUED | OUTPATIENT
Start: 2025-02-10 | End: 2025-02-10 | Stop reason: HOSPADM

## 2025-02-10 RX ORDER — TROPICAMIDE 10 MG/ML
1 SOLUTION/ DROPS OPHTHALMIC
Status: COMPLETED | OUTPATIENT
Start: 2025-02-10 | End: 2025-02-10

## 2025-02-10 RX ORDER — FENTANYL CITRATE 50 UG/ML
INJECTION, SOLUTION INTRAMUSCULAR; INTRAVENOUS AS NEEDED
Status: DISCONTINUED | OUTPATIENT
Start: 2025-02-10 | End: 2025-02-10

## 2025-02-10 RX ORDER — PROPOFOL 10 MG/ML
INJECTION, EMULSION INTRAVENOUS AS NEEDED
Status: DISCONTINUED | OUTPATIENT
Start: 2025-02-10 | End: 2025-02-10

## 2025-02-10 RX ORDER — EPINEPHRINE 1 MG/ML
INJECTION INTRAMUSCULAR; INTRAVENOUS; SUBCUTANEOUS AS NEEDED
Status: DISCONTINUED | OUTPATIENT
Start: 2025-02-10 | End: 2025-02-10 | Stop reason: HOSPADM

## 2025-02-10 RX ORDER — CYCLOPENTOLATE HYDROCHLORIDE 10 MG/ML
1 SOLUTION/ DROPS OPHTHALMIC
Status: COMPLETED | OUTPATIENT
Start: 2025-02-10 | End: 2025-02-10

## 2025-02-10 RX ORDER — MIDAZOLAM HYDROCHLORIDE 1 MG/ML
INJECTION, SOLUTION INTRAMUSCULAR; INTRAVENOUS AS NEEDED
Status: DISCONTINUED | OUTPATIENT
Start: 2025-02-10 | End: 2025-02-10

## 2025-02-10 RX ORDER — ONDANSETRON HYDROCHLORIDE 2 MG/ML
4 INJECTION, SOLUTION INTRAVENOUS ONCE AS NEEDED
Status: CANCELLED | OUTPATIENT
Start: 2025-02-10

## 2025-02-10 RX ORDER — BUPIVACAINE HYDROCHLORIDE 7.5 MG/ML
INJECTION, SOLUTION EPIDURAL; RETROBULBAR AS NEEDED
Status: DISCONTINUED | OUTPATIENT
Start: 2025-02-10 | End: 2025-02-10 | Stop reason: HOSPADM

## 2025-02-10 RX ORDER — LIDOCAINE HYDROCHLORIDE 10 MG/ML
0.1 INJECTION, SOLUTION EPIDURAL; INFILTRATION; INTRACAUDAL; PERINEURAL ONCE
Status: CANCELLED | OUTPATIENT
Start: 2025-02-10 | End: 2025-02-10

## 2025-02-10 RX ORDER — SODIUM CHLORIDE 0.9 % (FLUSH) 0.9 %
SYRINGE (ML) INJECTION CONTINUOUS PRN
Status: DISCONTINUED | OUTPATIENT
Start: 2025-02-10 | End: 2025-02-10

## 2025-02-10 RX ADMIN — FENTANYL CITRATE 25 MCG: 50 INJECTION, SOLUTION INTRAMUSCULAR; INTRAVENOUS at 08:24

## 2025-02-10 RX ADMIN — CYCLOPENTOLATE HYDROCHLORIDE 1 DROP: 10 SOLUTION/ DROPS OPHTHALMIC at 07:20

## 2025-02-10 RX ADMIN — FENTANYL CITRATE 25 MCG: 50 INJECTION, SOLUTION INTRAMUSCULAR; INTRAVENOUS at 07:51

## 2025-02-10 RX ADMIN — MOXIFLOXACIN OPHTHALMIC 1 DROP: 5 SOLUTION/ DROPS OPHTHALMIC at 07:15

## 2025-02-10 RX ADMIN — TROPICAMIDE 1 DROP: 10 SOLUTION/ DROPS OPHTHALMIC at 07:30

## 2025-02-10 RX ADMIN — Medication: at 07:51

## 2025-02-10 RX ADMIN — TROPICAMIDE 1 DROP: 10 SOLUTION/ DROPS OPHTHALMIC at 07:10

## 2025-02-10 RX ADMIN — CYCLOPENTOLATE HYDROCHLORIDE 1 DROP: 10 SOLUTION/ DROPS OPHTHALMIC at 07:10

## 2025-02-10 RX ADMIN — MIDAZOLAM HYDROCHLORIDE 1 MG: 1 INJECTION, SOLUTION INTRAMUSCULAR; INTRAVENOUS at 07:51

## 2025-02-10 RX ADMIN — CYCLOPENTOLATE HYDROCHLORIDE 1 DROP: 10 SOLUTION/ DROPS OPHTHALMIC at 07:30

## 2025-02-10 RX ADMIN — PHENYLEPHRINE HYDROCHLORIDE 1 DROP: 25 SOLUTION/ DROPS OPHTHALMIC at 07:10

## 2025-02-10 RX ADMIN — MOXIFLOXACIN OPHTHALMIC 1 DROP: 5 SOLUTION/ DROPS OPHTHALMIC at 07:10

## 2025-02-10 RX ADMIN — PHENYLEPHRINE HYDROCHLORIDE 1 DROP: 25 SOLUTION/ DROPS OPHTHALMIC at 07:30

## 2025-02-10 RX ADMIN — PHENYLEPHRINE HYDROCHLORIDE 1 DROP: 25 SOLUTION/ DROPS OPHTHALMIC at 07:20

## 2025-02-10 RX ADMIN — TROPICAMIDE 1 DROP: 10 SOLUTION/ DROPS OPHTHALMIC at 07:20

## 2025-02-10 RX ADMIN — TETRACAINE HYDROCHLORIDE 1 DROP: 5 SOLUTION OPHTHALMIC at 07:10

## 2025-02-10 RX ADMIN — MOXIFLOXACIN OPHTHALMIC 1 DROP: 5 SOLUTION/ DROPS OPHTHALMIC at 07:20

## 2025-02-10 RX ADMIN — MIDAZOLAM HYDROCHLORIDE 0.5 MG: 1 INJECTION, SOLUTION INTRAMUSCULAR; INTRAVENOUS at 08:24

## 2025-02-10 RX ADMIN — PROPOFOL 30 MG: 10 INJECTION, EMULSION INTRAVENOUS at 07:51

## 2025-02-10 SDOH — HEALTH STABILITY: MENTAL HEALTH: CURRENT SMOKER: 0

## 2025-02-10 ASSESSMENT — ENCOUNTER SYMPTOMS
FEVER: 0
WHEEZING: 0
ABDOMINAL DISTENTION: 0
SEIZURES: 0
COLOR CHANGE: 0
FACIAL SWELLING: 0
FACIAL ASYMMETRY: 0
STRIDOR: 0
DIAPHORESIS: 0
JOINT SWELLING: 0
RHINORRHEA: 0
EYE REDNESS: 0
AGITATION: 0
EYE DISCHARGE: 0

## 2025-02-10 ASSESSMENT — PAIN SCALES - GENERAL
PAIN_LEVEL: 4
PAINLEVEL_OUTOF10: 0 - NO PAIN

## 2025-02-10 ASSESSMENT — COLUMBIA-SUICIDE SEVERITY RATING SCALE - C-SSRS
1. IN THE PAST MONTH, HAVE YOU WISHED YOU WERE DEAD OR WISHED YOU COULD GO TO SLEEP AND NOT WAKE UP?: NO
6. HAVE YOU EVER DONE ANYTHING, STARTED TO DO ANYTHING, OR PREPARED TO DO ANYTHING TO END YOUR LIFE?: NO
2. HAVE YOU ACTUALLY HAD ANY THOUGHTS OF KILLING YOURSELF?: NO

## 2025-02-10 ASSESSMENT — PAIN - FUNCTIONAL ASSESSMENT: PAIN_FUNCTIONAL_ASSESSMENT: 0-10

## 2025-02-10 NOTE — H&P
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     Past Medical History  Past Medical History:   Diagnosis Date    Arrhythmia     Cataract     Hyperlipidemia     Hypertension     Hypothyroidism     Pulmonary embolism        Surgical History  Past Surgical History:   Procedure Laterality Date    COLONOSCOPY  11/15/2017    Complete Colonoscopy    OTHER SURGICAL HISTORY  05/31/2017    Atrial Cardioversion    PROSTATECTOMY          Social History  He reports that he has never smoked. He has never used smokeless tobacco. He reports current alcohol use of about 3.0 standard drinks of alcohol per week. He reports that he does not use drugs.    Family History  Family History   Problem Relation Name Age of Onset    Heart attack Mother      Heart attack Father      Alzheimer's disease Sister          Allergies  Patient has no known allergies.    Review of Systems   Constitutional:  Negative for diaphoresis and fever.   HENT:  Negative for facial swelling and rhinorrhea.    Eyes:  Negative for discharge and redness.   Respiratory:  Negative for wheezing and stridor.    Cardiovascular:  Negative for leg swelling.   Gastrointestinal:  Negative for abdominal distention.   Musculoskeletal:  Negative for joint swelling.   Skin:  Negative for color change and pallor.   Neurological:  Negative for seizures, syncope and facial asymmetry.   Psychiatric/Behavioral:  Negative for agitation and behavioral problems.         Physical Exam  Constitutional:       Appearance: Normal appearance.   HENT:      Head: Normocephalic and atraumatic.   Eyes:      General:         Right eye: No discharge.         Left eye: No discharge.      Conjunctiva/sclera: Conjunctivae normal.   Cardiovascular:      Rate and Rhythm: Normal rate and regular rhythm.   Pulmonary:      Effort: Pulmonary effort is normal. No respiratory distress.      Breath sounds: No stridor.   Abdominal:  "     General: Abdomen is flat. There is no distension.   Skin:     General: Skin is warm and dry.   Neurological:      General: No focal deficit present.      Mental Status: He is alert. Mental status is at baseline.   Psychiatric:         Mood and Affect: Mood normal.         Behavior: Behavior normal.          Last Recorded Vitals  Blood pressure 132/76, pulse 51, temperature 36.7 °C (98.1 °F), temperature source Temporal, resp. rate 20, height 1.753 m (5' 9\"), weight 81.8 kg (180 lb 5.4 oz), SpO2 98%.    Relevant Results             Assessment/Plan   Assessment & Plan  Combined forms of age-related cataract of both eyes      Proceed with previously planned CEIOL OD.            Jonny Johnston MD    "

## 2025-02-10 NOTE — ANESTHESIA PREPROCEDURE EVALUATION
Patient: Arturo Brumfield MD    Procedure Information       Date/Time: 02/10/25 0730    Procedure: Cataract extraction with intraocular lens implantation (Right)    [facility] [place]    Surgeons: Felicita Mims MD            Relevant Problems   Cardiac   (+) Benign essential hypertension   (+) Combined hyperlipidemia   (+) Paroxysmal atrial fibrillation (Multi)      Pulmonary   (+) Pulmonary embolism      Neuro   (+) Anxiety   (+) Left lumbar radiculopathy   (+) Left sided sciatica      /Renal   (+) Adenocarcinoma of prostate (Multi)   (+) BPH with obstruction/lower urinary tract symptoms   (+) Benign prostatic hyperplasia   (+) Prostate CA (Multi)   (+) Prostate cancer (Multi)   (+) UTI (urinary tract infection)      Endocrine   (+) Hypothyroidism      Musculoskeletal   (+) Lumbar herniated disc      ID   (+) Disease due to severe acute respiratory syndrome coronavirus 2 (SARS-CoV-2)   (+) UTI (urinary tract infection)       Clinical information reviewed:   Tobacco  Allergies  Meds   Med Hx  Surg Hx   Fam Hx  Soc Hx        NPO Detail:  NPO/Void Status  Date of Last Liquid: 02/09/25  Time of Last Liquid: 1900  Date of Last Solid: 02/09/25  Time of Last Solid: 1900         Physical Exam    Airway  Mallampati: I  TM distance: >3 FB  Neck ROM: full     Cardiovascular - normal exam     Dental - normal exam     Pulmonary - normal exam     Abdominal - normal exam         Anesthesia Plan    History of general anesthesia?: yes  History of complications of general anesthesia?: no    ASA 3     MAC     The patient is not a current smoker.  Patient was not previously instructed to abstain from smoking on day of procedure.  Patient did not smoke on day of procedure.    intravenous induction   Postoperative administration of opioids is intended.  Anesthetic plan and risks discussed with patient.  Use of blood products discussed with patient who.    Plan discussed with CRNA.

## 2025-02-10 NOTE — DISCHARGE INSTRUCTIONS
No bending, lifting over 5-10 pounds, or straining/bearing down  Leave patch and shield on until first postoperative visit with Dr Vasquez the day after surgery  No water or dirt to eye  Please attend postoperative visit with Dr Mims 2/11/25 at 1:45 pm at  [facility]

## 2025-02-10 NOTE — BRIEF OP NOTE
Date: 2/10/2025  OR Location: [facility] OR    Name: Arturo Brumfield MD, : 1945, Age: 79 y.o., MRN: [id number], Sex: male    Diagnosis  Pre-op Diagnosis      * Combined forms of age-related cataract of both eyes [H25.813] Post-op Diagnosis     * Combined forms of age-related cataract of both eyes [H25.813]     Procedures  Cataract extraction with intraocular lens implantation  76731 - OH XCAPSL CTRC RMVL INSJ IO LENS PROSTH W/O ECP      Surgeons      * Felicita Mims - Primary    Resident/Fellow/Other Assistant:  Surgeons and Role:  * No surgeons found with a matching role *    Staff:   Circulator: Billie Steeleub Person: Roni    Anesthesia Staff: Anesthesiologist: Anupam Soriano MD  CRNA: NISHI Patel-CRNA    Procedure Summary  Anesthesia: Monitor Anesthesia Care  ASA: III  Estimated Blood Loss: <1mL  Intra-op Medications:   Administrations occurring from 0730 to 0830 on 02/10/25:   Medication Name Total Dose   balanced salts (BSS) intraocular solution 500 mL   EPINEPHrine (Adrenalin) injection 0.3 mg   lidocaine PF (Xylocaine) 20 mg/mL (2 %) 5 mL, bupivacaine PF 0.75 % (Marcaine) 5 mL syringe 5 mL   tetracaine (PF) 0.5 % ophthalmic solution 2 drop   lidocaine PF 2% (Xylocaine) 3 mL, EPINEPHrine (Adrenalin) 1 mL, balanced salts (BSS) 9 mL syringe 1 mL   chondroitin sulf-sod hyaluron (Duovisc) intraocular kit 0.5 mL   fentaNYL PF 0.05 mg/mL 50 mcg   midazolam (Versed) 1 mg/1 mL 1.5 mg   propofol (Diprivan) 10 mg/mL bolus 30 mg   sodium chloride 0.9 % flush Cannot be calculated              Anesthesia Record               Intraprocedure I/O Totals          Intake    sodium chloride 0.9 % flush 10.00 mL    Total Intake 10 mL       Output    Est. Blood Loss 0.5 mL    Total Output 0.5 mL       Net    Net Volume 9.5 mL          Specimen: No specimens collected               Findings: Cataract right eye; postoperatively posterior chamber intraocular lens (PCIOL) right eye    Complications:  None;  patient tolerated the procedure well.     Disposition: PACU - hemodynamically stable.  Condition: stable  Specimens Collected: No specimens collected  Attending Attestation: I performed the procedure.    Felicita Mims  Phone Number: [phone]

## 2025-02-10 NOTE — ANESTHESIA POSTPROCEDURE EVALUATION
Patient: Arturo Brumfield MD    Procedure Summary       Date: 02/10/25 [place] [facility] [facility]    Anesthesia Start: 0740 Anesthesia Stop: 0850    Procedure: Cataract extraction with intraocular lens implantation (Right: Eye) Diagnosis:       Combined forms of age-related cataract of both eyes      (Combined forms of age-related cataract of both eyes [H25.813])    Surgeons: Felicita Mims MD Responsible Provider: Anupam Soriano MD    Anesthesia Type: MAC ASA Status: 3            Anesthesia Type: MAC    Vitals Value Taken Time   /71 02/10/25 0902   Temp 36.7 °C (98.1 °F) 02/10/25 0902   Pulse 50 02/10/25 0902   Resp 16 02/10/25 0902   SpO2 99 % 02/10/25 0902       Anesthesia Post Evaluation    Patient location during evaluation: PACU  Patient participation: complete - patient participated  Level of consciousness: awake and alert  Pain score: 4  Pain management: adequate  Airway patency: patent  Cardiovascular status: acceptable  Respiratory status: acceptable  Hydration status: acceptable  Postoperative Nausea and Vomiting: none    No notable events documented.

## 2025-02-10 NOTE — OP NOTE
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 [H25.813]     Procedures  Cataract extraction with intraocular lens implantation  78008 - TX XCAPSL CTRC RMVL INSJ IO LENS PROSTH W/O ECP      Surgeons      * Felicita Mims - Primary    Resident/Fellow/Other Assistant:  Surgeons and Role:  * No surgeons found with a matching role *    Staff:   Mikulator: Billie Ballard Person: Roni  Circulator: Phyllis    Anesthesia Staff: Anesthesiologist: Anupam Soriano MD  CRNA: NISHI Patel-CRNA    Procedure Summary  Anesthesia: Monitor Anesthesia Care  ASA: III  Estimated Blood Loss: 0 mL  Intra-op Medications:   Administrations occurring from 0730 to 0830 on 02/10/25:   Medication Name Total Dose   balanced salts (BSS) intraocular solution 500 mL   EPINEPHrine (Adrenalin) injection 0.3 mg   lidocaine PF (Xylocaine) 20 mg/mL (2 %) 5 mL, bupivacaine PF 0.75 % (Marcaine) 5 mL syringe 5 mL   tetracaine (PF) 0.5 % ophthalmic solution 2 drop   lidocaine PF 2% (Xylocaine) 3 mL, EPINEPHrine (Adrenalin) 1 mL, balanced salts (BSS) 9 mL syringe 1 mL   chondroitin sulf-sod hyaluron (Duovisc) intraocular kit 0.5 mL   fentaNYL PF 0.05 mg/mL 50 mcg   midazolam (Versed) 1 mg/1 mL 1.5 mg   propofol (Diprivan) 10 mg/mL bolus 30 mg   sodium chloride 0.9 % flush Cannot be calculated              Anesthesia Record               Intraprocedure I/O Totals          Intake    sodium chloride 0.9 % flush 10.00 mL    Total Intake 10 mL       Output    Est. Blood Loss 0.5 mL    Total Output 0.5 mL       Net    Net Volume 9.5 mL          Specimen: No specimens collected              Drains and/or Catheters: * None in  log *    Tourniquet Times:         Implants:  Implants       Type Name Action Serial No.       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     The patient was seen in the preoperative area. The risks, benefits, complications, treatment options, non-operative alternatives, expected recovery and outcomes were discussed with the patient. The possibilities of reaction to medication, pulmonary aspiration, injury to surrounding structures, bleeding, recurrent infection, the need for additional procedures, failure to diagnose a condition, and creating a complication requiring transfusion or operation were discussed with the patient. The patient concurred with the proposed plan, giving informed consent.  The site of surgery was properly noted/marked if necessary per policy. The patient has been actively warmed in preoperative area. Preoperative antibiotics are not indicated. Venous thrombosis prophylaxis are not indicated.    Procedure Details: The patient was placed in the supine position on the operating room table where appropriate blood pressure and cardiac monitoring were initiated. The patient was prepped with instillation of Betadine 5% onto the ocular surface followed by irrigation with balance salt solution a minute or two later. The local anesthetic used was a 1:1 mixture of 2% Xylocaine and 0.5% bupivacaine. 5 mL were used to perform a peribulbar block of the operative eye.  The patient was prepped and draped in the usual sterile fashion for intraocular surgery. This included instillation of Betadine 5% onto the ocular surface followed by irrigation  with balance salt solution a minute or two later. A lid speculum was placed and the operating microscope was positioned. One paracentesis stab incision was made to the left of the planned cataract incision with a 15-degree supersharp blade. 1 ml of preservative free lidocaine was injected into the AC. Viscoat was used to replace the aqueous humor. A temporal clear corneal wound was fashioned beginning at the limbus with a 2.2 mm keratome, extending 2 mm into clear cornea before entering the anterior chamber. A continuous tear circular capsulorhexis of approximately 5 mm in diameter was performed. Hydrodissection was performed using a Camarena canula. The endothelium was coated with viscoat again. Using the Ozil handpiece on the 3C Plus Lens Removal System, the nucleus was emulsified and aspirated using a divide-and-conquer technique. Residual cortex was removed from the eye with the irrigation/aspiration bimanually. ProVisc was used to inflate the capsular bag. The lens implant was inspected and found to be free of visible defects. The lens used was model DIB00, power 22.0 diopter intraocular lens. The lens was inserted into the capsular bag. The lens was centered with a Torres hook. Residual viscoelastic was removed from the eye with the irrigation/aspiration instrument. Preservative free moxifloxacin was injected  intracameral. The wounds were checked and found to be watertight. The lid speculum was removed and the eye was dressed with Maxitrol ointment, eye pad, tape, and shield. The patient tolerated the procedure well, and there were no complications.    Complications:  None; patient tolerated the procedure well.    Disposition: PACU - hemodynamically stable.  Condition: stable                 Additional Details: none    Attending Attestation: I performed the procedure.    Felicita Mims  Phone Number: [phone]

## 2025-02-11 ENCOUNTER — APPOINTMENT (OUTPATIENT)
Dept: OPHTHALMOLOGY | Facility: CLINIC | Age: 80
End: 2025-02-11
Payer: MEDICARE

## 2025-02-11 DIAGNOSIS — Z98.41 CATARACT EXTRACTION STATUS OF RIGHT EYE: ICD-10-CM

## 2025-02-11 PROCEDURE — 99024 POSTOP FOLLOW-UP VISIT: CPT | Performed by: OPHTHALMOLOGY

## 2025-02-11 RX ORDER — PREDNISOLONE ACETATE 10 MG/ML
1 SUSPENSION/ DROPS OPHTHALMIC 4 TIMES DAILY
Qty: 10 ML | Refills: 1 | Status: SHIPPED | OUTPATIENT
Start: 2025-02-11

## 2025-02-11 ASSESSMENT — PACHYMETRY
OS_CT(UM): 504
OD_CT(UM): 484

## 2025-02-11 ASSESSMENT — VISUAL ACUITY
OS_SC: 20/30
OS_SC+: -1
CORRECTION_TYPE: GLASSES
OD_SC+: +2
OD_SC: 20/30
METHOD: SNELLEN - LINEAR

## 2025-02-11 ASSESSMENT — ENCOUNTER SYMPTOMS
HEMATOLOGIC/LYMPHATIC NEGATIVE: 0
RESPIRATORY NEGATIVE: 0
CARDIOVASCULAR NEGATIVE: 0
PSYCHIATRIC NEGATIVE: 0
EYES NEGATIVE: 1
CONSTITUTIONAL NEGATIVE: 0
ENDOCRINE NEGATIVE: 0
ALLERGIC/IMMUNOLOGIC NEGATIVE: 0
NEUROLOGICAL NEGATIVE: 0
GASTROINTESTINAL NEGATIVE: 0
MUSCULOSKELETAL NEGATIVE: 0

## 2025-02-11 ASSESSMENT — CONF VISUAL FIELD
OD_INFERIOR_NASAL_RESTRICTION: 0
OS_INFERIOR_TEMPORAL_RESTRICTION: 0
OS_NORMAL: 1
OD_INFERIOR_TEMPORAL_RESTRICTION: 0
OS_SUPERIOR_TEMPORAL_RESTRICTION: 0
OD_SUPERIOR_TEMPORAL_RESTRICTION: 0
OD_NORMAL: 1
OD_SUPERIOR_NASAL_RESTRICTION: 0
OS_SUPERIOR_NASAL_RESTRICTION: 0
OS_INFERIOR_NASAL_RESTRICTION: 0

## 2025-02-11 ASSESSMENT — CUP TO DISC RATIO
OS_RATIO: .45
OD_RATIO: .45

## 2025-02-11 ASSESSMENT — EXTERNAL EXAM - LEFT EYE: OS_EXAM: NORMAL

## 2025-02-11 ASSESSMENT — TONOMETRY
OS_IOP_MMHG: 14
OD_IOP_MMHG: 16
IOP_METHOD: TONOPEN

## 2025-02-11 ASSESSMENT — SLIT LAMP EXAM - LIDS
COMMENTS: GOOD POSITION
COMMENTS: GOOD POSITION

## 2025-02-11 ASSESSMENT — EXTERNAL EXAM - RIGHT EYE: OD_EXAM: NORMAL

## 2025-02-11 NOTE — PROGRESS NOTES
2/11/2025  CC: 79 y.o. presents for POD1 s/p Phaco/PCIOL OD 2/10/2025. Slept well.      Past ocular history: glaucoma suspect  Family history: no family history of glaucoma   Past medical history: HTN, hypothyroidism, prostate cancer, others per chart   Social history: denies tobacco     Eye medications:   Both eyes: Restasis bid, Ats PRN    Allergy:  NKDA    Testing:    OCT 1/6/2025:  OD- full, avg 93. OS- full, avg 88 um. GCA: full OU. Mac: Regular macular contour, /252    Pachymetry 1/6/2025:  484/504    Gonioscopy: open OU    Tmax: mid- teens    Assessment:   S/p Phaco/PCIOL OD 2/10/2025  -POD1: VA 20/30, IOP 16. Mild K edema.      Glaucoma suspect  - GENE 2/2022, Dr Joe  - Negative fhx  - Thinner C  - CDR 0.5  - Testing: wnl    REGINA  - Using Restasis/Ats    Plan:   I explained my findings. PO instructions as provided.     Eye medications:   Right eye: Moxi/PF qid     Return in POW1 OD

## 2025-02-17 ENCOUNTER — APPOINTMENT (OUTPATIENT)
Dept: UROLOGY | Facility: HOSPITAL | Age: 80
End: 2025-02-17
Payer: MEDICARE

## 2025-02-18 ENCOUNTER — ANESTHESIA EVENT (OUTPATIENT)
Dept: OPERATING ROOM | Facility: CLINIC | Age: 80
End: 2025-02-18
Payer: MEDICARE

## 2025-02-19 ENCOUNTER — APPOINTMENT (OUTPATIENT)
Dept: PRIMARY CARE | Facility: CLINIC | Age: 80
End: 2025-02-19
Payer: MEDICARE

## 2025-02-19 VITALS
DIASTOLIC BLOOD PRESSURE: 82 MMHG | WEIGHT: 177 LBS | SYSTOLIC BLOOD PRESSURE: 126 MMHG | BODY MASS INDEX: 26.14 KG/M2 | HEART RATE: 52 BPM

## 2025-02-19 DIAGNOSIS — R53.81 MALAISE: ICD-10-CM

## 2025-02-19 DIAGNOSIS — E29.1 HYPOGONADISM MALE: ICD-10-CM

## 2025-02-19 DIAGNOSIS — E78.2 COMBINED HYPERLIPIDEMIA: ICD-10-CM

## 2025-02-19 DIAGNOSIS — I48.91 ATRIAL FIBRILLATION, UNSPECIFIED TYPE (MULTI): ICD-10-CM

## 2025-02-19 DIAGNOSIS — E03.9 HYPOTHYROIDISM, UNSPECIFIED TYPE: ICD-10-CM

## 2025-02-19 DIAGNOSIS — I10 BENIGN ESSENTIAL HYPERTENSION: ICD-10-CM

## 2025-02-19 DIAGNOSIS — Z12.5 SCREENING PSA (PROSTATE SPECIFIC ANTIGEN): ICD-10-CM

## 2025-02-19 DIAGNOSIS — J30.9 ALLERGIC RHINITIS, UNSPECIFIED SEASONALITY, UNSPECIFIED TRIGGER: ICD-10-CM

## 2025-02-19 DIAGNOSIS — I11.0 LVH (LEFT VENTRICULAR HYPERTROPHY) DUE TO HYPERTENSIVE DISEASE, WITH HEART FAILURE: Primary | ICD-10-CM

## 2025-02-19 DIAGNOSIS — C61 PROSTATE CA (MULTI): ICD-10-CM

## 2025-02-19 PROCEDURE — 3074F SYST BP LT 130 MM HG: CPT | Performed by: INTERNAL MEDICINE

## 2025-02-19 PROCEDURE — 1159F MED LIST DOCD IN RCRD: CPT | Performed by: INTERNAL MEDICINE

## 2025-02-19 PROCEDURE — G0439 PPPS, SUBSEQ VISIT: HCPCS | Performed by: INTERNAL MEDICINE

## 2025-02-19 PROCEDURE — 1126F AMNT PAIN NOTED NONE PRSNT: CPT | Performed by: INTERNAL MEDICINE

## 2025-02-19 PROCEDURE — 1160F RVW MEDS BY RX/DR IN RCRD: CPT | Performed by: INTERNAL MEDICINE

## 2025-02-19 PROCEDURE — 1124F ACP DISCUSS-NO DSCNMKR DOCD: CPT | Performed by: INTERNAL MEDICINE

## 2025-02-19 PROCEDURE — 1036F TOBACCO NON-USER: CPT | Performed by: INTERNAL MEDICINE

## 2025-02-19 PROCEDURE — 3079F DIAST BP 80-89 MM HG: CPT | Performed by: INTERNAL MEDICINE

## 2025-02-19 PROCEDURE — 1170F FXNL STATUS ASSESSED: CPT | Performed by: INTERNAL MEDICINE

## 2025-02-19 PROCEDURE — 99214 OFFICE O/P EST MOD 30 MIN: CPT | Performed by: INTERNAL MEDICINE

## 2025-02-19 RX ORDER — METHYLPREDNISOLONE 4 MG/1
TABLET ORAL
Qty: 21 TABLET | Refills: 0 | Status: SHIPPED | OUTPATIENT
Start: 2025-02-19 | End: 2025-02-25

## 2025-02-19 ASSESSMENT — ACTIVITIES OF DAILY LIVING (ADL)
DRESSING: INDEPENDENT
GROCERY_SHOPPING: INDEPENDENT
BATHING: INDEPENDENT
MANAGING_FINANCES: INDEPENDENT
TAKING_MEDICATION: INDEPENDENT
DOING_HOUSEWORK: INDEPENDENT

## 2025-02-19 ASSESSMENT — ENCOUNTER SYMPTOMS
ARTHRALGIAS: 0
WHEEZING: 0
ANAL BLEEDING: 0
POLYDIPSIA: 0
PALPITATIONS: 0
BRUISES/BLEEDS EASILY: 0
RHINORRHEA: 0
BACK PAIN: 0
NUMBNESS: 0
DIAPHORESIS: 0
NAUSEA: 0
COLOR CHANGE: 0
POLYPHAGIA: 0
FREQUENCY: 0
LIGHT-HEADEDNESS: 0
SPEECH DIFFICULTY: 0
SINUS PAIN: 0
WOUND: 0
FACIAL SWELLING: 0
EYE REDNESS: 0
DYSURIA: 0
PHOTOPHOBIA: 0
ADENOPATHY: 0
SHORTNESS OF BREATH: 0
MYALGIAS: 0
SINUS PRESSURE: 0
BLOOD IN STOOL: 0
CHOKING: 0
RECTAL PAIN: 0
ABDOMINAL PAIN: 0
EYE DISCHARGE: 0
TROUBLE SWALLOWING: 0
DIFFICULTY URINATING: 0
EYE ITCHING: 0
CONSTIPATION: 0
SLEEP DISTURBANCE: 0
FLANK PAIN: 0
HEMATURIA: 0
JOINT SWELLING: 0
VOMITING: 0
HEADACHES: 0
EYE PAIN: 0
FATIGUE: 0
SEIZURES: 0
NECK STIFFNESS: 0
APPETITE CHANGE: 0
FACIAL ASYMMETRY: 0
STRIDOR: 0
COUGH: 0
SORE THROAT: 0
NECK PAIN: 0
ACTIVITY CHANGE: 0
VOICE CHANGE: 0
DIARRHEA: 0
CHEST TIGHTNESS: 0
CHILLS: 0
WEAKNESS: 0
ABDOMINAL DISTENTION: 0
DIZZINESS: 0
TREMORS: 0

## 2025-02-19 ASSESSMENT — PATIENT HEALTH QUESTIONNAIRE - PHQ9
1. LITTLE INTEREST OR PLEASURE IN DOING THINGS: SEVERAL DAYS
10. IF YOU CHECKED OFF ANY PROBLEMS, HOW DIFFICULT HAVE THESE PROBLEMS MADE IT FOR YOU TO DO YOUR WORK, TAKE CARE OF THINGS AT HOME, OR GET ALONG WITH OTHER PEOPLE: SOMEWHAT DIFFICULT
SUM OF ALL RESPONSES TO PHQ9 QUESTIONS 1 AND 2: 2
2. FEELING DOWN, DEPRESSED OR HOPELESS: SEVERAL DAYS

## 2025-02-19 ASSESSMENT — PAIN SCALES - GENERAL: PAINLEVEL_OUTOF10: 0-NO PAIN

## 2025-02-19 NOTE — PROGRESS NOTES
2/20/2025 CC: 79 y.o. presents for POW1 s/p Phaco/PCIOL OD 2/10/2025.    Past ocular history: glaucoma suspect  Family history: no family history of glaucoma   Past medical history: HTN, hypothyroidism, prostate cancer, others per chart   Social history: denies tobacco     Eye medications:   Right eye: Moxi/PF  Left eye: Restasis bid, Ats PRN    Allergy:  NKDA    Testing:    OCT 1/6/2025:  OD- full, avg 93. OS- full, avg 88 um. GCA: full OU. Mac: Regular macular contour, /252    Pachymetry 1/6/2025:  484/504    Gonioscopy: open OU    Tmax: mid- teens    Assessment:   S/p Phaco/PCIOL OD 2/10/2025  -POD1: VA 20/30, IOP 16. Mild K edema.  -POW1:VA 20/25, IOP 10. K edema resolved.    NS cataract OS  -Scheduled 2/24/2025    Glaucoma suspect  - GENE 2/2022, Dr Joe  - Negative fhx  - Thinner C  - CDR 0.5  - Testing: wnl    REG[place]  - Using Restasis/Ats    Plan:   I explained my findings. PO instructions as provided.     Eye medications:   Right eye: PF taper     Return in POD1 OS

## 2025-02-19 NOTE — PROGRESS NOTES
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    Patient presents for wellness exam and follow-up.  He has been compliant with his medications, diet and exercise.  He overall feels well.  He denies any headaches, no dizziness, but does complain of allergy symptoms.  He denies any chest pain or shortness of breath, no abdominal pain no nausea vomiting or diarrhea.  He reports no new musculoskeletal complaints.  He is following at [facility] for memory loss..  Symptoms are at baseline         Review of Systems   Constitutional:  Negative for activity change, appetite change, chills, diaphoresis and fatigue.   HENT:  Negative for congestion, dental problem, drooling, ear discharge, ear pain, facial swelling, hearing loss, mouth sores, nosebleeds, postnasal drip, rhinorrhea, sinus pressure, sinus pain, sneezing, sore throat, tinnitus, trouble swallowing and voice change.    Eyes:  Negative for photophobia, pain, discharge, redness, itching and visual disturbance.   Respiratory:  Negative for cough, choking, chest tightness, shortness of breath, wheezing and stridor.    Cardiovascular:  Negative for chest pain, palpitations and leg swelling.   Gastrointestinal:  Negative for abdominal distention, abdominal pain, anal bleeding, blood in stool, constipation, diarrhea, nausea, rectal pain and vomiting.   Endocrine: Negative for cold intolerance, heat intolerance, polydipsia, polyphagia and polyuria.   Genitourinary:  Negative for decreased urine volume, difficulty urinating, dysuria, enuresis, flank pain, frequency, genital sores, hematuria and urgency.   Musculoskeletal:  Negative for arthralgias, back pain, gait problem, joint swelling, myalgias, neck pain and neck stiffness.   Skin:  Negative for color change, pallor, rash and wound.   Neurological:  Negative for dizziness, tremors, seizures, syncope, facial asymmetry, speech difficulty,  weakness, light-headedness, numbness and headaches.   Hematological:  Negative for adenopathy. Does not bruise/bleed easily.   Psychiatric/Behavioral:  Negative for sleep disturbance.        Objective   Wt 80.3 kg (177 lb)   BMI 26.14 kg/m²     Physical Exam  Constitutional:       Appearance: Normal appearance.   Cardiovascular:      Rate and Rhythm: Normal rate and regular rhythm.      Heart sounds: No murmur heard.     No gallop.   Pulmonary:      Effort: No respiratory distress.      Breath sounds: No wheezing or rales.   Abdominal:      General: There is no distension.      Palpations: There is no mass.      Tenderness: There is no abdominal tenderness. There is no guarding.   Musculoskeletal:      Right lower leg: No edema.      Left lower leg: No edema.   Neurological:      Mental Status: He is alert.         Assessment/Plan   Diagnoses and all orders for this visit:  LVH (left ventricular hypertrophy) due to hypertensive disease, with heart failure-denies any symptoms  Atrial fibrillation, unspecified type (Multi)-rate is controlled.  Will continue with anticoagulation  -     Referral to [facility]  Prostate CA (Multi)-will check PSA.  Follow-up with urology  -     Referral to [facility]  -     Prostate Specific Antigen, Screen; Future  Benign essential hypertension-stable off of medication  -     Comprehensive Metabolic Panel; Future  Malaise  -     CBC and Auto Differential; Future  Hypothyroidism, unspecified type  -     TSH with reflex to Free T4 if abnormal; Future  Hypogonadism male-will check testosterone level.  He understands the risk of testosterone replacement and prostate cancer.  He continues to want to take testosterone  -     Testosterone, total and free; Future  Combined hyperlipidemia-check lipid profile  -     Lipid Panel; Future  Screening PSA (prostate specific antigen)  -     Prostate Specific Antigen, Screen; Future  Allergic rhinitis, unspecified seasonality, unspecified  trigger-will treat with a Medrol Dosepak  -     methylPREDNISolone (Medrol Dospak) 4 mg tablets; Take as directed on package.  Health maintenance-Cologuard has been done.  Immunizations are up-to-date.  Eye and dental have been done.  Advance care planning discussed

## 2025-02-20 ENCOUNTER — APPOINTMENT (OUTPATIENT)
Dept: OPHTHALMOLOGY | Facility: CLINIC | Age: 80
End: 2025-02-20
Payer: MEDICARE

## 2025-02-20 DIAGNOSIS — Z98.41 CATARACT EXTRACTION STATUS OF RIGHT EYE: ICD-10-CM

## 2025-02-20 DIAGNOSIS — Z98.42 CATARACT EXTRACTION STATUS OF LEFT EYE: Primary | ICD-10-CM

## 2025-02-20 PROCEDURE — 99024 POSTOP FOLLOW-UP VISIT: CPT | Performed by: OPHTHALMOLOGY

## 2025-02-20 RX ORDER — MOXIFLOXACIN 5 MG/ML
1 SOLUTION/ DROPS OPHTHALMIC 4 TIMES DAILY
Qty: 5 ML | Refills: 0 | Status: SHIPPED | OUTPATIENT
Start: 2025-02-20 | End: 2025-02-21 | Stop reason: SDUPTHER

## 2025-02-20 RX ORDER — PREDNISOLONE ACETATE 10 MG/ML
1 SUSPENSION/ DROPS OPHTHALMIC 4 TIMES DAILY
Qty: 5 ML | Refills: 2 | Status: SHIPPED | OUTPATIENT
Start: 2025-02-20

## 2025-02-20 ASSESSMENT — TONOMETRY
OS_IOP_MMHG: 11
OD_IOP_MMHG: 10
IOP_METHOD: GOLDMANN APPLANATION

## 2025-02-20 ASSESSMENT — PACHYMETRY
OD_CT(UM): 484
OS_CT(UM): 504

## 2025-02-20 ASSESSMENT — VISUAL ACUITY
METHOD: SNELLEN - LINEAR
OS_CC: 20/40
OD_SC: 20/25

## 2025-02-20 ASSESSMENT — EXTERNAL EXAM - LEFT EYE: OS_EXAM: NORMAL

## 2025-02-20 ASSESSMENT — EXTERNAL EXAM - RIGHT EYE: OD_EXAM: NORMAL

## 2025-02-20 ASSESSMENT — CUP TO DISC RATIO
OS_RATIO: .45
OD_RATIO: .45

## 2025-02-20 ASSESSMENT — SLIT LAMP EXAM - LIDS
COMMENTS: GOOD POSITION
COMMENTS: GOOD POSITION

## 2025-02-21 DIAGNOSIS — Z98.42 CATARACT EXTRACTION STATUS OF LEFT EYE: ICD-10-CM

## 2025-02-21 DIAGNOSIS — Z98.41 CATARACT EXTRACTION STATUS OF RIGHT EYE: ICD-10-CM

## 2025-02-21 RX ORDER — MOXIFLOXACIN 5 MG/ML
1 SOLUTION/ DROPS OPHTHALMIC 4 TIMES DAILY
Qty: 3 ML | Refills: 0 | Status: SHIPPED | OUTPATIENT
Start: 2025-02-21

## 2025-02-21 RX ORDER — PREDNISOLONE ACETATE 10 MG/ML
1 SUSPENSION/ DROPS OPHTHALMIC 4 TIMES DAILY
Qty: 10 ML | Refills: 1 | Status: SHIPPED | OUTPATIENT
Start: 2025-02-21

## 2025-02-22 LAB
ALBUMIN SERPL-MCNC: 4.2 G/DL (ref 3.6–5.1)
ALP SERPL-CCNC: 55 U/L (ref 35–144)
ALT SERPL-CCNC: 16 U/L (ref 9–46)
ANION GAP SERPL CALCULATED.4IONS-SCNC: 8 MMOL/L (CALC) (ref 7–17)
AST SERPL-CCNC: 26 U/L (ref 10–35)
BASOPHILS # BLD AUTO: 30 CELLS/UL (ref 0–200)
BASOPHILS NFR BLD AUTO: 0.8 %
BILIRUB SERPL-MCNC: 0.9 MG/DL (ref 0.2–1.2)
BUN SERPL-MCNC: 13 MG/DL (ref 7–25)
CALCIUM SERPL-MCNC: 9.6 MG/DL (ref 8.6–10.3)
CHLORIDE SERPL-SCNC: 101 MMOL/L (ref 98–110)
CHOLEST SERPL-MCNC: 175 MG/DL
CHOLEST/HDLC SERPL: 3.9 (CALC)
CO2 SERPL-SCNC: 30 MMOL/L (ref 20–32)
CREAT SERPL-MCNC: 0.91 MG/DL (ref 0.7–1.22)
EGFRCR SERPLBLD CKD-EPI 2021: 85 ML/MIN/1.73M2
EOSINOPHIL # BLD AUTO: 70 CELLS/UL (ref 15–500)
EOSINOPHIL NFR BLD AUTO: 1.9 %
ERYTHROCYTE [DISTWIDTH] IN BLOOD BY AUTOMATED COUNT: 12.6 % (ref 11–15)
GLUCOSE SERPL-MCNC: 86 MG/DL (ref 65–99)
HCT VFR BLD AUTO: 48.7 % (ref 38.5–50)
HDLC SERPL-MCNC: 45 MG/DL
HGB BLD-MCNC: 16.2 G/DL (ref 13.2–17.1)
LDLC SERPL CALC-MCNC: 108 MG/DL (CALC)
LYMPHOCYTES # BLD AUTO: 1143 CELLS/UL (ref 850–3900)
LYMPHOCYTES NFR BLD AUTO: 30.9 %
MCH RBC QN AUTO: 30.9 PG (ref 27–33)
MCHC RBC AUTO-ENTMCNC: 33.3 G/DL (ref 32–36)
MCV RBC AUTO: 92.8 FL (ref 80–100)
MONOCYTES # BLD AUTO: 355 CELLS/UL (ref 200–950)
MONOCYTES NFR BLD AUTO: 9.6 %
NEUTROPHILS # BLD AUTO: 2102 CELLS/UL (ref 1500–7800)
NEUTROPHILS NFR BLD AUTO: 56.8 %
NONHDLC SERPL-MCNC: 130 MG/DL (CALC)
PLATELET # BLD AUTO: 206 THOUSAND/UL (ref 140–400)
PMV BLD REES-ECKER: 9.4 FL (ref 7.5–12.5)
POTASSIUM SERPL-SCNC: 4.3 MMOL/L (ref 3.5–5.3)
PROT SERPL-MCNC: 7 G/DL (ref 6.1–8.1)
PSA SERPL-MCNC: 0.37 NG/ML
RBC # BLD AUTO: 5.25 MILLION/UL (ref 4.2–5.8)
SODIUM SERPL-SCNC: 139 MMOL/L (ref 135–146)
TESTOST FREE SERPL-MCNC: 47.8 PG/ML (ref 30–135)
TESTOST SERPL-MCNC: 435 NG/DL (ref 250–1100)
TRIGL SERPL-MCNC: 116 MG/DL
TSH SERPL-ACNC: 2.25 MIU/L (ref 0.4–4.5)
WBC # BLD AUTO: 3.7 THOUSAND/UL (ref 3.8–10.8)

## 2025-02-24 ENCOUNTER — ANESTHESIA (OUTPATIENT)
Dept: OPERATING ROOM | Facility: CLINIC | Age: 80
End: 2025-02-24
Payer: MEDICARE

## 2025-02-24 ENCOUNTER — HOSPITAL ENCOUNTER (OUTPATIENT)
Facility: CLINIC | Age: 80
Setting detail: OUTPATIENT SURGERY
Discharge: HOME | End: 2025-02-24
Attending: OPHTHALMOLOGY | Admitting: OPHTHALMOLOGY
Payer: MEDICARE

## 2025-02-24 VITALS
HEART RATE: 50 BPM | WEIGHT: 177 LBS | RESPIRATION RATE: 18 BRPM | BODY MASS INDEX: 26.22 KG/M2 | DIASTOLIC BLOOD PRESSURE: 80 MMHG | TEMPERATURE: 97.7 F | SYSTOLIC BLOOD PRESSURE: 118 MMHG | OXYGEN SATURATION: 96 % | HEIGHT: 69 IN

## 2025-02-24 DIAGNOSIS — H25.813 COMBINED FORMS OF AGE-RELATED CATARACT OF BOTH EYES: Primary | ICD-10-CM

## 2025-02-24 DIAGNOSIS — Z98.42 CATARACT EXTRACTION STATUS OF LEFT EYE: ICD-10-CM

## 2025-02-24 PROCEDURE — 3600000008 HC OR TIME - EACH INCREMENTAL 1 MINUTE - PROCEDURE LEVEL THREE: Performed by: OPHTHALMOLOGY

## 2025-02-24 PROCEDURE — 2500000004 HC RX 250 GENERAL PHARMACY W/ HCPCS (ALT 636 FOR OP/ED): Performed by: REGISTERED NURSE

## 2025-02-24 PROCEDURE — 2500000005 HC RX 250 GENERAL PHARMACY W/O HCPCS: Performed by: OPHTHALMOLOGY

## 2025-02-24 PROCEDURE — 2720000007 HC OR 272 NO HCPCS: Performed by: OPHTHALMOLOGY

## 2025-02-24 PROCEDURE — 2760000001 HC OR 276 NO HCPCS: Performed by: OPHTHALMOLOGY

## 2025-02-24 PROCEDURE — 2500000004 HC RX 250 GENERAL PHARMACY W/ HCPCS (ALT 636 FOR OP/ED): Performed by: OPHTHALMOLOGY

## 2025-02-24 PROCEDURE — 7100000009 HC PHASE TWO TIME - INITIAL BASE CHARGE: Performed by: OPHTHALMOLOGY

## 2025-02-24 PROCEDURE — 3700000001 HC GENERAL ANESTHESIA TIME - INITIAL BASE CHARGE: Performed by: OPHTHALMOLOGY

## 2025-02-24 PROCEDURE — 3700000002 HC GENERAL ANESTHESIA TIME - EACH INCREMENTAL 1 MINUTE: Performed by: OPHTHALMOLOGY

## 2025-02-24 PROCEDURE — V2632 POST CHMBR INTRAOCULAR LENS: HCPCS | Performed by: OPHTHALMOLOGY

## 2025-02-24 PROCEDURE — 99100 ANES PT EXTEME AGE<1 YR&>70: CPT | Performed by: ANESTHESIOLOGY

## 2025-02-24 PROCEDURE — 7100000010 HC PHASE TWO TIME - EACH INCREMENTAL 1 MINUTE: Performed by: OPHTHALMOLOGY

## 2025-02-24 PROCEDURE — 66984 XCAPSL CTRC RMVL W/O ECP: CPT | Performed by: OPHTHALMOLOGY

## 2025-02-24 PROCEDURE — A66984 PR REMV CATARACT EXTRACAP,INSERT LENS: Performed by: REGISTERED NURSE

## 2025-02-24 PROCEDURE — 3600000003 HC OR TIME - INITIAL BASE CHARGE - PROCEDURE LEVEL THREE: Performed by: OPHTHALMOLOGY

## 2025-02-24 PROCEDURE — A66984 PR REMV CATARACT EXTRACAP,INSERT LENS: Performed by: ANESTHESIOLOGY

## 2025-02-24 DEVICE — TECNIS SIMPLICITY TECNIS EYHANCE U 21.5D
Type: IMPLANTABLE DEVICE | Site: EYE | Status: FUNCTIONAL
Brand: TECNIS SIMPLICITY

## 2025-02-24 RX ORDER — MOXIFLOXACIN 5 MG/ML
SOLUTION/ DROPS OPHTHALMIC AS NEEDED
Status: DISCONTINUED | OUTPATIENT
Start: 2025-02-24 | End: 2025-02-24 | Stop reason: HOSPADM

## 2025-02-24 RX ORDER — LIDOCAINE HYDROCHLORIDE 10 MG/ML
0.1 INJECTION, SOLUTION EPIDURAL; INFILTRATION; INTRACAUDAL; PERINEURAL ONCE
Status: CANCELLED | OUTPATIENT
Start: 2025-02-24 | End: 2025-02-24

## 2025-02-24 RX ORDER — CYCLOPENTOLATE HYDROCHLORIDE 10 MG/ML
1 SOLUTION/ DROPS OPHTHALMIC
Status: COMPLETED | OUTPATIENT
Start: 2025-02-24 | End: 2025-02-24

## 2025-02-24 RX ORDER — MIDAZOLAM HYDROCHLORIDE 1 MG/ML
INJECTION, SOLUTION INTRAMUSCULAR; INTRAVENOUS AS NEEDED
Status: DISCONTINUED | OUTPATIENT
Start: 2025-02-24 | End: 2025-02-24

## 2025-02-24 RX ORDER — TROPICAMIDE 10 MG/ML
1 SOLUTION/ DROPS OPHTHALMIC
Status: COMPLETED | OUTPATIENT
Start: 2025-02-24 | End: 2025-02-24

## 2025-02-24 RX ORDER — TETRACAINE HYDROCHLORIDE 5 MG/ML
1 SOLUTION OPHTHALMIC ONCE
Status: COMPLETED | OUTPATIENT
Start: 2025-02-24 | End: 2025-02-24

## 2025-02-24 RX ORDER — MIDAZOLAM HYDROCHLORIDE 1 MG/ML
1 INJECTION, SOLUTION INTRAMUSCULAR; INTRAVENOUS ONCE AS NEEDED
Status: CANCELLED | OUTPATIENT
Start: 2025-02-24

## 2025-02-24 RX ORDER — METOCLOPRAMIDE HYDROCHLORIDE 5 MG/ML
10 INJECTION INTRAMUSCULAR; INTRAVENOUS ONCE AS NEEDED
Status: CANCELLED | OUTPATIENT
Start: 2025-02-24

## 2025-02-24 RX ORDER — PHENYLEPHRINE HYDROCHLORIDE 25 MG/ML
1 SOLUTION/ DROPS OPHTHALMIC
Status: COMPLETED | OUTPATIENT
Start: 2025-02-24 | End: 2025-02-24

## 2025-02-24 RX ORDER — MOXIFLOXACIN 5 MG/ML
1 SOLUTION/ DROPS OPHTHALMIC
Status: COMPLETED | OUTPATIENT
Start: 2025-02-24 | End: 2025-02-24

## 2025-02-24 RX ORDER — FENTANYL CITRATE 50 UG/ML
INJECTION, SOLUTION INTRAMUSCULAR; INTRAVENOUS AS NEEDED
Status: DISCONTINUED | OUTPATIENT
Start: 2025-02-24 | End: 2025-02-24

## 2025-02-24 RX ORDER — MOXIFLOXACIN 5 MG/ML
1 SOLUTION/ DROPS OPHTHALMIC 4 TIMES DAILY
Qty: 3 ML | Refills: 0 | Status: SHIPPED | OUTPATIENT
Start: 2025-02-24

## 2025-02-24 RX ORDER — OXYCODONE AND ACETAMINOPHEN 5; 325 MG/1; MG/1
1 TABLET ORAL EVERY 4 HOURS PRN
Status: CANCELLED | OUTPATIENT
Start: 2025-02-24

## 2025-02-24 RX ORDER — PROPOFOL 10 MG/ML
INJECTION, EMULSION INTRAVENOUS AS NEEDED
Status: DISCONTINUED | OUTPATIENT
Start: 2025-02-24 | End: 2025-02-24

## 2025-02-24 RX ADMIN — TROPICAMIDE 1 DROP: 10 SOLUTION/ DROPS OPHTHALMIC at 08:10

## 2025-02-24 RX ADMIN — CYCLOPENTOLATE HYDROCHLORIDE 1 DROP: 10 SOLUTION/ DROPS OPHTHALMIC at 08:05

## 2025-02-24 RX ADMIN — MOXIFLOXACIN OPHTHALMIC 1 DROP: 5 SOLUTION/ DROPS OPHTHALMIC at 08:10

## 2025-02-24 RX ADMIN — PHENYLEPHRINE HYDROCHLORIDE 1 DROP: 25 SOLUTION/ DROPS OPHTHALMIC at 08:15

## 2025-02-24 RX ADMIN — CYCLOPENTOLATE HYDROCHLORIDE 1 DROP: 10 SOLUTION/ DROPS OPHTHALMIC at 08:10

## 2025-02-24 RX ADMIN — PHENYLEPHRINE HYDROCHLORIDE 1 DROP: 25 SOLUTION/ DROPS OPHTHALMIC at 08:10

## 2025-02-24 RX ADMIN — PHENYLEPHRINE HYDROCHLORIDE 1 DROP: 25 SOLUTION/ DROPS OPHTHALMIC at 08:05

## 2025-02-24 RX ADMIN — TETRACAINE HYDROCHLORIDE 1 DROP: 5 SOLUTION OPHTHALMIC at 08:05

## 2025-02-24 RX ADMIN — MIDAZOLAM HYDROCHLORIDE 1.5 MG: 1 INJECTION, SOLUTION INTRAMUSCULAR; INTRAVENOUS at 08:59

## 2025-02-24 RX ADMIN — FENTANYL CITRATE 25 MCG: 50 INJECTION, SOLUTION INTRAMUSCULAR; INTRAVENOUS at 08:59

## 2025-02-24 RX ADMIN — MOXIFLOXACIN OPHTHALMIC 1 DROP: 5 SOLUTION/ DROPS OPHTHALMIC at 08:05

## 2025-02-24 RX ADMIN — CYCLOPENTOLATE HYDROCHLORIDE 1 DROP: 10 SOLUTION/ DROPS OPHTHALMIC at 08:15

## 2025-02-24 RX ADMIN — TROPICAMIDE 1 DROP: 10 SOLUTION/ DROPS OPHTHALMIC at 08:15

## 2025-02-24 RX ADMIN — MOXIFLOXACIN OPHTHALMIC 1 DROP: 5 SOLUTION/ DROPS OPHTHALMIC at 08:15

## 2025-02-24 RX ADMIN — TROPICAMIDE 1 DROP: 10 SOLUTION/ DROPS OPHTHALMIC at 08:05

## 2025-02-24 RX ADMIN — PROPOFOL 30 MG: 10 INJECTION, EMULSION INTRAVENOUS at 09:03

## 2025-02-24 ASSESSMENT — PAIN SCALES - GENERAL
PAIN_LEVEL: 0
PAINLEVEL_OUTOF10: 0 - NO PAIN

## 2025-02-24 ASSESSMENT — PAIN - FUNCTIONAL ASSESSMENT: PAIN_FUNCTIONAL_ASSESSMENT: 0-10

## 2025-02-24 NOTE — BRIEF OP NOTE
Date: 2025  OR Location: [facility] OR    Name: Arturo Brumfield MD, : 1945, Age: 79 y.o., MRN: [id number], Sex: male    Diagnosis  Pre-op Diagnosis      * Combined forms of age-related cataract of both eyes [H25.813] Post-op Diagnosis     * Combined forms of age-related cataract of both eyes [H25.813]     Procedures  Cataract extraction with intraocular lens implantation  66356 - PA XCAPSL CTRC RMVL INSJ IO LENS PROSTH W/O ECP      Surgeons      * Felicita Mims - Primary    Resident/Fellow/Other Assistant:  Surgeons and Role:  * No surgeons found with a matching role *    Staff:   Circulator: Phyllis Ballard Person: Barbara    Anesthesia Staff: Anesthesiologist: Mikhail Dinh MD  CRNA: NISHI Solano-DOMITILA    Procedure Summary  Anesthesia: Monitor Anesthesia Care  ASA: III  Estimated Blood Loss: <1 mL  Intra-op Medications:   Administrations occurring from 0830 to 0930 on 25:   Medication Name Total Dose   balanced salts (BSS) intraocular solution 15 mL   tobramycin-dexamethasone (Tobradex) ophthalmic ointment 0.5 inch   moxifloxacin (Vigamox) 0.5 % ophthalmic solution 1 drop   chondroitin sulf-sod hyaluron (Duovisc) intraocular kit 0.5 mL   lidocaine PF (Xylocaine) 20 mg/mL (2 %) 5 mL, bupivacaine PF 0.75 % (Marcaine) 5 mL syringe 6 mL   lidocaine PF 2% (Xylocaine) 3 mL, EPINEPHrine (Adrenalin) 1 mL, balanced salts (BSS) 9 mL syringe 1 mL   EPINEPHrine (Adrenalin) 1 mg/mL 0.3 mg in balanced salts (BSS) 500 mL irrigation 0.3 mg   fentaNYL PF 0.05 mg/mL 25 mcg   midazolam (Versed) 1 mg/1 mL 2 mg   propofol (Diprivan) injection 10 mg/mL 30 mg              Anesthesia Record               Intraprocedure I/O Totals       None           Specimen: No specimens collected               Findings: intraocular lens (IOL) in the bag    Complications:  None; patient tolerated the procedure well.     Disposition: PACU - hemodynamically stable.  Condition: stable  Specimens Collected: No specimens  collected  Attending Attestation:     Felciita Mims  Phone Number: [phone]

## 2025-02-24 NOTE — ANESTHESIA PREPROCEDURE EVALUATION
Patient: Arturo Brumfield MD    Procedure Information       Date/Time: 02/24/25 0830    Procedure: Cataract extraction with intraocular lens implantation (Left)    Location: [facility] 02 / [facility]    Surgeons: Felicita Mims MD            Relevant Problems   Cardiac   (+) Benign essential hypertension   (+) Combined hyperlipidemia   (+) Paroxysmal atrial fibrillation (Multi)      Pulmonary   (+) Pulmonary embolism      Neuro   (+) Anxiety   (+) Left lumbar radiculopathy   (+) Left sided sciatica      /Renal   (+) Adenocarcinoma of prostate (Multi)   (+) BPH with obstruction/lower urinary tract symptoms   (+) Benign prostatic hyperplasia   (+) Prostate CA (Multi)   (+) Prostate cancer (Multi)   (+) UTI (urinary tract infection)      Endocrine   (+) Hypothyroidism      Musculoskeletal   (+) Lumbar herniated disc      ID   (+) Disease due to severe acute respiratory syndrome coronavirus 2 (SARS-CoV-2)   (+) UTI (urinary tract infection)       Clinical information reviewed:   Tobacco  Allergies  Meds   Med Hx  Surg Hx   Fam Hx  Soc Hx        NPO Detail:  NPO/Void Status  Date of Last Liquid: 02/23/25  Time of Last Liquid: 2100  Date of Last Solid: 02/23/25  Time of Last Solid: 2100         Physical Exam    Airway  Mallampati: I  TM distance: >3 FB  Neck ROM: full     Cardiovascular - normal exam     Dental - normal exam     Pulmonary - normal exam     Abdominal - normal exam             Anesthesia Plan    History of general anesthesia?: yes  History of complications of general anesthesia?: no    ASA 3     MAC     intravenous induction   Anesthetic plan and risks discussed with patient.    Plan discussed with CRNA.

## 2025-02-24 NOTE — PROGRESS NOTES
2/25/2025 CC: 79 y.o. presents for POD1 s/p Phaco/PCIOL OS 2/24/2025, POW2 s/p Phaco/PCIOL OD 2/10/2025.    Past ocular history: glaucoma suspect  Family history: no family history of glaucoma   Past medical history: HTN, hypothyroidism, prostate cancer, others per chart   Social history: denies tobacco     Eye medications:   Right eye: PF qd  Left eye: none    Allergy:  NKDA    Testing:    OCT 1/6/2025:  OD- full, avg 93. OS- full, avg 88 um. GCA: full OU. Mac: Regular macular contour, /252    Pachymetry 1/6/2025:  484/504    Gonioscopy: open OU    Tmax: mid- teens    Assessment:   S/p Phaco/PCIOL OS 2/25/2025  - POD1: VA 20/20, IOP 18, Mild K edema     S/p Phaco/PCIOL OD 2/10/2025  -POD1: VA 20/30, IOP 16. Mild K edema.  -POW1:VA 20/25, IOP 10. K edema resolved.  - POW2: VA 20/20, IOP 13. K edema resolved    Glaucoma suspect  - GENE 2/2022, Dr Joe  - Negative fhx  - Thinner C  - CDR 0.5  - Testing: wnl    REGINA  - Using Restasis/Ats    Plan:   I explained my findings. PO instructions as provided.     Eye medications:   Right eye: PF taper  Left eye: Moxi/PF qid     Return in POW1 as scheduled

## 2025-02-24 NOTE — DISCHARGE INSTRUCTIONS
Keep eye patch and shield on until tomorrow    Sleep with shield at night for 7 days  No eye rubbing  May shower and wash face but no water inside surgery eye  No lifting any weight above 10lbs  Patient to resume home medications.   Please call immediately if you develop any redness, pain, decreased vision, flashes, floaters.   Office phone (after hours): [phone]   Hospital : [phone] (call this number if unable to reach someone on the phone above) then ask for ophthalmologist on call.        Follow up tomorrow with Dr. Mims as scheduled.

## 2025-02-24 NOTE — H&P
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     Past Medical History  Past Medical History:   Diagnosis Date    Arrhythmia     Cataract     Disease of thyroid gland     Hyperlipidemia     Hypertension     Hypothyroidism     Pulmonary embolism        Surgical History  Past Surgical History:   Procedure Laterality Date    COLONOSCOPY  11/15/2017    Complete Colonoscopy    OTHER SURGICAL HISTORY  05/31/2017    Atrial Cardioversion    PROSTATECTOMY          Social History  He reports that he quit smoking about 51 years ago. His smoking use included cigarettes. He started smoking about 54 years ago. He has a 2 pack-year smoking history. He has never used smokeless tobacco. He reports current alcohol use of about 7.0 standard drinks of alcohol per week. He reports that he does not use drugs.    Family History  Family History   Problem Relation Name Age of Onset    Heart attack Mother Haylee     Hypertension Mother Haylee     Heart attack Father Arturo Sr     Hypertension Father Arturo Sr     Alzheimer's disease Sister Rosalia     Hypertension Sister Rosalia     Hypertension Brother Matthew         Allergies  Patient has no known allergies.    Review of Systems   All other systems reviewed and are negative.       Physical Exam  HENT:      Head: Normocephalic.   Cardiovascular:      Rate and Rhythm: Normal rate.   Pulmonary:      Effort: Pulmonary effort is normal.   Neurological:      Mental Status: He is alert.          Last Recorded Vitals  Blood pressure 134/79, pulse 55, temperature 36.4 °C (97.5 °F), temperature source Temporal, resp. rate 16, height 1.753 m (5' 9\"), weight 80.3 kg (177 lb), SpO2 96%.    Relevant Results             Assessment/Plan   Assessment & Plan  Combined forms of age-related cataract of both eyes       79 y.o. male presenting with cataract in left eye " "OB/GYN VISIT  Neto Springer  2024  6:45 PM    Subjective:     Neto Springer is a 34 y.o.  female who presents for follow up from the ED where she was found to have BV and a UTI. When patient presented, she was feeling burning with urination and right sided flank pain. She was given a dose of ceftriaxone and started on flagyl. Patient has a history of TOA and was also sent with antibiotics in the case of PID, though her TVUS was normal and hydrosalpinx from  had resolved. Patient says she no longer has back pain but today did notice a slight burning with urination. Her discharge has also improved.      Past Medical History:   Diagnosis Date   • TOA (tubo-ovarian abscess)      Past Surgical History:   Procedure Laterality Date   • BLADDER REPAIR     • KIDNEY STONE SURGERY     • OVARIAN CYST REMOVAL Right        Objective:    Vitals: Blood pressure 121/69, pulse 89, height 4' 11\" (1.499 m), weight 39.4 kg (86 lb 12.8 oz), last menstrual period 06/15/2024, not currently breastfeeding.Body mass index is 17.53 kg/m².    Physical Exam  Constitutional:       General: She is not in acute distress.  HENT:      Head: Normocephalic and atraumatic.   Eyes:      Extraocular Movements: Extraocular movements intact.   Cardiovascular:      Rate and Rhythm: Normal rate.   Pulmonary:      Effort: Pulmonary effort is normal. No respiratory distress.   Abdominal:      General: Abdomen is flat.   Musculoskeletal:         General: No swelling.   Skin:     General: Skin is warm and dry.   Neurological:      General: No focal deficit present.      Mental Status: She is alert.   Psychiatric:         Mood and Affect: Mood normal.       Assessment/Plan:  1. Acute cystitis without hematuria  Assessment & Plan:  Symptoms resolving  S/p ceftriaxone  Urine culture positive for klebsiella  Instructed to call the office if her symptoms get worse or do not continue to improve  Recommended Azo for further symptomatic " here for cataract extraction and intraocular lens (IOL) insertion in the left eye.           Dom Jessica MD     relief  2. Bacterial vaginosis  Assessment & Plan:  Continue flagyl  Instructed to call office should her discharge persist or she develops new itching or odor    Follow up in clinic for annual visit or sooner ANDRY Mak MD  7/16/2024  6:45 PM

## 2025-02-24 NOTE — OP NOTE
Cataract extraction with intraocular lens implantation (L) Operative Note     Date: 2025  OR Location: [facility] OR    Name: Arturo Brumfield MD, : 1945, Age: 79 y.o., MRN: [id number], Sex: male    Diagnosis  Pre-op Diagnosis      * Combined forms of age-related cataract of both eyes [H25.813] Post-op Diagnosis     * Combined forms of age-related cataract of both eyes [H25.813]     Procedures  Cataract extraction with intraocular lens implantation  49084 - OR XCAPSL CTRC RMVL INSJ IO LENS PROSTH W/O ECP      Surgeons      * Felicita Mmis - Primary    Resident/Fellow/Other Assistant:  Surgeons and Role:  * No surgeons found with a matching role *    Staff:   Circulator: Phyllis Ballard Person: Barbara    Anesthesia Staff: Anesthesiologist: Mikhail Dinh MD  CRNA: NISHI Solano-DOMITILA    Procedure Summary  Anesthesia: Monitor Anesthesia Care  ASA: III  Estimated Blood Loss: 0 mL  Intra-op Medications:   Administrations occurring from 0830 to 0930 on 25:   Medication Name Total Dose   balanced salts (BSS) intraocular solution 15 mL   tobramycin-dexamethasone (Tobradex) ophthalmic ointment 0.5 inch   moxifloxacin (Vigamox) 0.5 % ophthalmic solution 1 drop   chondroitin sulf-sod hyaluron (Duovisc) intraocular kit 0.5 mL   lidocaine PF (Xylocaine) 20 mg/mL (2 %) 5 mL, bupivacaine PF 0.75 % (Marcaine) 5 mL syringe 6 mL   lidocaine PF 2% (Xylocaine) 3 mL, EPINEPHrine (Adrenalin) 1 mL, balanced salts (BSS) 9 mL syringe 1 mL   EPINEPHrine (Adrenalin) 1 mg/mL 0.3 mg in balanced salts (BSS) 500 mL irrigation 0.3 mg   fentaNYL PF 0.05 mg/mL 25 mcg   midazolam (Versed) 1 mg/1 mL 1.5 mg   propofol (Diprivan) injection 10 mg/mL 30 mg              Anesthesia Record               Intraprocedure I/O Totals       None           Specimen: No specimens collected              Drains and/or Catheters: * None in log *    Tourniquet Times:         Implants:  Implants       Type Name Action Serial No.       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     The patient was seen in the preoperative area. The risks, benefits, complications, treatment options, non-operative alternatives, expected recovery and outcomes were discussed with the patient. The possibilities of reaction to medication, pulmonary aspiration, injury to surrounding structures, bleeding, recurrent infection, the need for additional procedures, failure to diagnose a condition, and creating a complication requiring transfusion or operation were discussed with the patient. The patient concurred with the proposed plan, giving informed consent.  The site of surgery was properly noted/marked if necessary per policy. The patient has been actively warmed in preoperative area. Preoperative antibiotics are not indicated. Venous thrombosis prophylaxis are not indicated.    Procedure Details: The patient was placed in the supine position on the operating room table where appropriate blood pressure and cardiac monitoring were initiated. The patient was prepped with instillation of Betadine 5% onto the ocular surface followed by irrigation with balance salt solution a minute or two later. The local anesthetic used was a 1:1 mixture of 2% Xylocaine and 0.5% bupivacaine. 5 mL were used to perform a peribulbar block of the operative eye. The patient was prepped and draped in the usual sterile fashion for intraocular surgery. This included instillation of Betadine 5% onto the ocular surface followed by irrigation with balance salt solution a minute or two later. A lid speculum was placed and the operating microscope was positioned. One paracentesis stab incision was made to the left of the  planned cataract incision with a 15-degree supersharp blade. 1 ml of preservative free lidocaine was injected into the AC. Viscoat was used to replace the aqueous humor. A temporal clear corneal wound was fashioned beginning at the limbus with a 2.2 mm keratome, extending 2 mm into clear cornea before entering the anterior chamber. A continuous tear circular capsulorhexis of approximately 5 mm in diameter was performed. Hydrodissection was performed using a Camarena canula. The endothelium was coated with viscoat again. Using the Ozil handpiece on the [employer] Lens Removal System, the nucleus was emulsified and aspirated using a divide-and-conquer technique. Residual cortex was removed from the eye with the irrigation/aspiration bimanually. ProVisc was used to inflate the capsular bag. The lens implant was inspected and found to be free of visible defects. The lens used was model DIB00, power 21.5 diopter intraocular lens. The lens was inserted into the capsular bag. The lens was centered with a Torres hook. Residual viscoelastic was removed from the eye with the irrigation/aspiration instrument. Preservative free moxifloxacin was injected  intracameral. The wounds were checked and found to be watertight. The lid speculum was removed and the eye was dressed with Maxitrol ointment, eye pad, tape, and shield. The patient tolerated the procedure well, and there were no complications.    Complications:  None; patient tolerated the procedure well.    Disposition: PACU - hemodynamically stable.  Condition: stable                 Additional Details: none    Attending Attestation:     Felicita Mims  Phone Number: [phone]

## 2025-02-24 NOTE — ANESTHESIA POSTPROCEDURE EVALUATION
Patient: Arturo Brumfield MD    Procedure Summary       Date: 02/24/25 [facility] / [facility] / [facility]    Anesthesia Start: 0857 Anesthesia Stop: 0941    Procedure: Cataract extraction with intraocular lens implantation (Left) Diagnosis:       Combined forms of age-related cataract of both eyes      (Combined forms of age-related cataract of both eyes [H25.813])    Surgeons: Felicita Mims MD Responsible Provider: Mikhail Dinh MD    Anesthesia Type: MAC ASA Status: 3            Anesthesia Type: MAC    Vitals Value Taken Time   /81 02/24/25 0939   Temp 36.4 °C (97.5 °F) 02/24/25 0939   Pulse 51 02/24/25 0939   Resp 20 02/24/25 0939   SpO2  02/24/25 0953       Anesthesia Post Evaluation    Patient location during evaluation: PACU  Patient participation: complete - patient participated  Level of consciousness: awake  Pain score: 0  Pain management: adequate  Airway patency: patent  Cardiovascular status: acceptable  Respiratory status: acceptable  Hydration status: acceptable  Postoperative Nausea and Vomiting: none        There were no known notable events for this encounter.

## 2025-02-25 ENCOUNTER — APPOINTMENT (OUTPATIENT)
Dept: OPHTHALMOLOGY | Facility: CLINIC | Age: 80
End: 2025-02-25
Payer: MEDICARE

## 2025-02-25 DIAGNOSIS — Z98.42 CATARACT EXTRACTION STATUS OF LEFT EYE: Primary | ICD-10-CM

## 2025-02-25 PROCEDURE — 99024 POSTOP FOLLOW-UP VISIT: CPT | Performed by: OPHTHALMOLOGY

## 2025-02-25 ASSESSMENT — ENCOUNTER SYMPTOMS
HEMATOLOGIC/LYMPHATIC NEGATIVE: 0
PSYCHIATRIC NEGATIVE: 0
CARDIOVASCULAR NEGATIVE: 0
ALLERGIC/IMMUNOLOGIC NEGATIVE: 0
MUSCULOSKELETAL NEGATIVE: 0
GASTROINTESTINAL NEGATIVE: 0
EYES NEGATIVE: 1
CONSTITUTIONAL NEGATIVE: 0
ENDOCRINE NEGATIVE: 0
NEUROLOGICAL NEGATIVE: 0
RESPIRATORY NEGATIVE: 0

## 2025-02-25 ASSESSMENT — PACHYMETRY
OS_CT(UM): 504
OD_CT(UM): 484

## 2025-02-25 ASSESSMENT — VISUAL ACUITY
METHOD: SNELLEN - LINEAR
OS_SC: 20/20
OD_SC: 20/20

## 2025-02-25 ASSESSMENT — CONF VISUAL FIELD
OD_NORMAL: 1
OS_INFERIOR_NASAL_RESTRICTION: 0
OD_SUPERIOR_TEMPORAL_RESTRICTION: 0
OD_INFERIOR_NASAL_RESTRICTION: 0
OD_INFERIOR_TEMPORAL_RESTRICTION: 0
OS_SUPERIOR_NASAL_RESTRICTION: 0
OD_SUPERIOR_NASAL_RESTRICTION: 0
OS_INFERIOR_TEMPORAL_RESTRICTION: 0
OS_NORMAL: 1
OS_SUPERIOR_TEMPORAL_RESTRICTION: 0

## 2025-02-25 ASSESSMENT — EXTERNAL EXAM - LEFT EYE: OS_EXAM: NORMAL

## 2025-02-25 ASSESSMENT — SLIT LAMP EXAM - LIDS
COMMENTS: GOOD POSITION
COMMENTS: GOOD POSITION

## 2025-02-25 ASSESSMENT — TONOMETRY
OS_IOP_MMHG: 18
IOP_METHOD: GOLDMANN APPLANATION
OD_IOP_MMHG: 13

## 2025-02-25 ASSESSMENT — PAIN SCALES - GENERAL: PAINLEVEL_OUTOF10: 0 - NO PAIN

## 2025-02-25 ASSESSMENT — EXTERNAL EXAM - RIGHT EYE: OD_EXAM: NORMAL

## 2025-02-25 ASSESSMENT — CUP TO DISC RATIO
OD_RATIO: .45
OS_RATIO: .45

## 2025-03-03 ENCOUNTER — APPOINTMENT (OUTPATIENT)
Dept: UROLOGY | Facility: HOSPITAL | Age: 80
End: 2025-03-03
Payer: MEDICARE

## 2025-03-03 NOTE — PROGRESS NOTES
3/4/2025 CC: 79 y.o. presents for POW1 s/p Phaco/PCIOL OS 2/24/2025, POW2 s/p Phaco/PCIOL OD 2/10/2025.    Past ocular history: glaucoma suspect, Pseudophakia OU  Family history: no family history of glaucoma   Past medical history: HTN, hypothyroidism, prostate cancer, others per chart   Social history: denies tobacco     Eye medications:   Right eye: PF qd  Left eye: Moxi/PF    Allergy:  NKDA    Testing:    OCT 1/6/2025:  OD- full, avg 93. OS- full, avg 88 um. GCA: full OU. Mac: Regular macular contour, /252    Pachymetry 1/6/2025:  484/504    Gonioscopy: open OU    Tmax: mid- teens    Assessment:   S/p Phaco/PCIOL OS 2/25/2025  -POD1: VA 20/20, IOP 18, Mild K edema   -POW1:VA 20/20, IOP 10. K edema resolved     S/p Phaco/PCIOL OD 2/10/2025  -POD1: VA 20/30, IOP 16. Mild K edema.  -POW1:VA 20/25, IOP 10. K edema resolved.  -POW2: VA 20/20, IOP 13. K edema resolved  -POM1:VA 20/20, IOP 9. Looks good.    Glaucoma suspect  - GENE 2/2022, Dr Joe  - Negative fhx  - Thinner C  - CDR 0.5  - Testing: wnl    [place]  - Using Restasis/Ats    Plan:   I explained my findings. PO instructions as provided.     Eye medications:   Right eye: continue PF taper  Left eye: PF taper     Return in POM1 as scheduled/MRx

## 2025-03-04 ENCOUNTER — APPOINTMENT (OUTPATIENT)
Dept: OPHTHALMOLOGY | Facility: CLINIC | Age: 80
End: 2025-03-04
Payer: MEDICARE

## 2025-03-04 DIAGNOSIS — Z98.42 CATARACT EXTRACTION STATUS OF LEFT EYE: Primary | ICD-10-CM

## 2025-03-04 DIAGNOSIS — I48.0 PAROXYSMAL ATRIAL FIBRILLATION (MULTI): ICD-10-CM

## 2025-03-04 LAB — PSA SERPL-MCNC: 0.38 NG/ML

## 2025-03-04 PROCEDURE — 99024 POSTOP FOLLOW-UP VISIT: CPT | Performed by: OPHTHALMOLOGY

## 2025-03-04 ASSESSMENT — CONF VISUAL FIELD
OD_INFERIOR_TEMPORAL_RESTRICTION: 0
OD_NORMAL: 1
OD_INFERIOR_NASAL_RESTRICTION: 0
OD_SUPERIOR_TEMPORAL_RESTRICTION: 0
OS_SUPERIOR_NASAL_RESTRICTION: 0
OS_NORMAL: 1
OS_SUPERIOR_TEMPORAL_RESTRICTION: 0
OS_INFERIOR_TEMPORAL_RESTRICTION: 0
OS_INFERIOR_NASAL_RESTRICTION: 0
OD_SUPERIOR_NASAL_RESTRICTION: 0

## 2025-03-04 ASSESSMENT — CUP TO DISC RATIO
OS_RATIO: .45
OD_RATIO: .45

## 2025-03-04 ASSESSMENT — ENCOUNTER SYMPTOMS
MUSCULOSKELETAL NEGATIVE: 0
ENDOCRINE NEGATIVE: 0
HEMATOLOGIC/LYMPHATIC NEGATIVE: 0
EYES NEGATIVE: 1
CONSTITUTIONAL NEGATIVE: 0
ALLERGIC/IMMUNOLOGIC NEGATIVE: 0
GASTROINTESTINAL NEGATIVE: 0
PSYCHIATRIC NEGATIVE: 0
RESPIRATORY NEGATIVE: 0
NEUROLOGICAL NEGATIVE: 0
CARDIOVASCULAR NEGATIVE: 0

## 2025-03-04 ASSESSMENT — VISUAL ACUITY
OS_SC: 20/20
OS_SC+: -3
METHOD: SNELLEN - LINEAR
OD_SC+: -2
OD_SC: 20/20

## 2025-03-04 ASSESSMENT — EXTERNAL EXAM - LEFT EYE: OS_EXAM: NORMAL

## 2025-03-04 ASSESSMENT — TONOMETRY
IOP_METHOD: GOLDMANN APPLANATION
OS_IOP_MMHG: 9
OD_IOP_MMHG: 10

## 2025-03-04 ASSESSMENT — SLIT LAMP EXAM - LIDS
COMMENTS: GOOD POSITION
COMMENTS: GOOD POSITION

## 2025-03-04 ASSESSMENT — PACHYMETRY
OD_CT(UM): 484
OS_CT(UM): 504

## 2025-03-04 ASSESSMENT — EXTERNAL EXAM - RIGHT EYE: OD_EXAM: NORMAL

## 2025-03-06 ENCOUNTER — APPOINTMENT (OUTPATIENT)
Dept: UROLOGY | Facility: CLINIC | Age: 80
End: 2025-03-06
Payer: MEDICARE

## 2025-03-06 NOTE — PROGRESS NOTES
UROLOGIC FOLLOW-UP VISIT     PROBLEM LIST:  No diagnosis found.       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 Harris. Dr. Brumfield transferred care to me in December 2022. Patient was tried on myrbetriq and trospium for urgency sx. He has discontinued the trospium mainly due to dry mouth. He states he does feel improvement with the myrbetriq.     He also is on trt and has underwent an IPP placement. He had a PSA recurrence, however, his values have remained stable. He denies any current fevers, chills, nausea, vomiting, unintentional weight loss, increased fatigue or new onset bone pain.     PAST MEDICAL HISTORY:  Past Medical History:   Diagnosis Date    Arrhythmia     Cataract     Disease of thyroid gland     Hyperlipidemia     Hypertension     Hypothyroidism     Pulmonary embolism        PAST SURGICAL HISTORY:  Past Surgical History:   Procedure Laterality Date    COLONOSCOPY  11/15/2017    Complete Colonoscopy    OTHER SURGICAL HISTORY  05/31/2017    Atrial Cardioversion    PROSTATECTOMY          ALLERGIES:   No Known Allergies     MEDICATIONS:     Current Outpatient Medications:     allopurinol (Zyloprim) 100 mg tablet, TAKE 1 TABLET DAILY, Disp: 90 tablet, Rfl: 3    alpha tocopherol (Vitamin E) 268 mg (400 unit) capsule, Take 1 capsule (400 Units) by mouth once daily., Disp: , Rfl:     cycloSPORINE (Restasis) 0.05 % ophthalmic emulsion, INSTILL 1 DROP IN BOTH EYES TWICE A DAY, Disp: 180 each, Rfl: 3    escitalopram (Lexapro) 10 mg tablet, TAKE 1 TABLET DAILY, Disp: 90 tablet, Rfl: 3    levothyroxine (Synthroid, Levoxyl) 100 mcg tablet, TAKE 1 TABLET DAILY, Disp: 90 tablet, Rfl: 3    mirabegron (Myrbetriq) 50 mg tablet extended release 24 hr 24 hr tablet, Take 1 tablet (50 mg) by mouth once daily., Disp: 90 tablet, Rfl: 3     mirabegron (Myrbetriq) 50 mg tablet extended release 24 hr 24 hr tablet, TAKE 1 TABLET BY MOUTH EVERY DAY, Disp: 30 tablet, Rfl: 2    moxifloxacin (Vigamox) 0.5 % ophthalmic solution, Administer 1 drop into the left eye 4 times a day., Disp: 3 mL, Rfl: 0    multivitamin (Multiple Vitamins) tablet, Take 1 tablet by mouth once daily., Disp: , Rfl:     Myrbetriq 50 mg tablet extended release 24 hr 24 hr tablet, Take 1 tablet (50 mg) by mouth once daily., Disp: , Rfl:     pravastatin (Pravachol) 40 mg tablet, TAKE 1 TABLET DAILY AT BEDTIME, Disp: 90 tablet, Rfl: 3    prednisoLONE acetate (Pred-Forte) 1 % ophthalmic suspension, Administer 1 drop into the left eye 4 times a day., Disp: 5 mL, Rfl: 2    prednisoLONE acetate (Pred-Forte) 1 % ophthalmic suspension, Administer 1 drop into the left eye 4 times a day., Disp: 10 mL, Rfl: 1    rivaroxaban (Xarelto) 20 mg tablet, Take 1 tablet (20 mg) by mouth once daily with breakfast., Disp: 90 tablet, Rfl: 3    testosterone (Fortesta) 10 mg/0.5 gram /actuation gel in metered-dose pump gel, PLACE 4 PUMP ON THE SKIN ONCE DAILY IN THE MORNING., Disp: 120 g, Rfl: 3      SOCIAL HISTORY:  Patient  reports that he quit smoking about 51 years ago. His smoking use included cigarettes. He started smoking about 54 years ago. He has a 2 pack-year smoking history. He has never used smokeless tobacco. He reports current alcohol use of about 7.0 standard drinks of alcohol per week. He reports that he does not use drugs.   Social History     Socioeconomic History    Marital status:      Spouse name: Not on file    Number of children: Not on file    Years of education: Not on file    Highest education level: Not on file   Occupational History    Not on file   Tobacco Use    Smoking status: Former     Current packs/day: 0.00     Average packs/day: 0.5 packs/day for 4.1 years (2.0 ttl pk-yrs)     Types: Cigarettes     Start date: 1/1/1971     Quit date: 1/30/1974     Years since quitting:  51.1    Smokeless tobacco: Never    Tobacco comments:     Off and on til 1980   Vaping Use    Vaping status: Never Used   Substance and Sexual Activity    Alcohol use: Yes     Alcohol/week: 7.0 standard drinks of alcohol     Types: 5 Glasses of wine, 2 Shots of liquor per week     Comment: 4-5 times week    Drug use: Never    Sexual activity: Yes     Partners: Female   Other Topics Concern    Not on file   Social History Narrative    Not on file     Social Drivers of Health     Financial Resource Strain: Not on file   Food Insecurity: Not on file   Transportation Needs: Not on file   Physical Activity: Sufficiently Active (8/27/2024)    Exercise Vital Sign     Days of Exercise per Week: 5 days     Minutes of Exercise per Session: 30 min   Stress: Not on file   Social Connections: Not on file   Intimate Partner Violence: Not on file   Housing Stability: Not on file       FAMILY HISTORY:  Family History   Problem Relation Name Age of Onset    Heart attack Mother Haylee     Hypertension Mother Haylee     Heart attack Father Arturo Philippe     Hypertension Father Arturo Philippe     Alzheimer's disease Sister Rosalia     Hypertension Sister Rosalia     Hypertension Brother Matthew        REVIEW OF SYSTEMS:   Constitutional: Negative for fever and chills. Denies anorexia, weight loss.  Eyes: Negative for visual disturbance.   Respiratory: Negative for shortness of breath.    Cardiovascular: Negative for chest pain.   Gastrointestinal: Negative for nausea and vomiting.   Genitourinary: See interval history above.  Skin: Negative for rash.   Neurological: Negative for dizziness and numbness.   Psychiatric/Behavioral: Negative for confusion and decreased concentration.     PHYSICAL EXAM:  There were no vitals taken for this visit.  Constitutional: Patient appears well-developed and well-nourished. No distress.    Head: Normocephalic and atraumatic.    Neck: Normal range of motion.    Cardiovascular: Normal rate.    Pulmonary/Chest:  Effort normal. No respiratory distress.   Abdominal: soft NTND  Musculoskeletal: Normal range of motion.    Neurological: Alert and oriented to person, place, and time.  Psychiatric: Normal mood and affect. Behavior is normal. Thought content normal.        LABORATORY REVIEW:     Lab Results   Component Value Date    BUN 13 02/19/2025    CREATININE 0.91 02/19/2025    EGFR 85 02/19/2025     02/19/2025    K 4.3 02/19/2025     02/19/2025    CO2 30 02/19/2025    CALCIUM 9.6 02/19/2025      Lab Results   Component Value Date    WBC 3.7 (L) 02/19/2025    RBC 5.25 02/19/2025    HGB 16.2 02/19/2025    HCT 48.7 02/19/2025    MCV 92.8 02/19/2025    MCH 30.9 02/19/2025    MCHC 33.3 02/19/2025    RDW 12.6 02/19/2025     02/19/2025    MPV 9.4 02/19/2025        Lab Results   Component Value Date    PSA 0.38 03/03/2025    PSA 0.37 02/19/2025    PSA 0.24 05/31/2024    PSA 0.23 02/26/2024    PSA 0.15 09/25/2023    PSA 0.15 03/01/2023    PSA 0.11 12/07/2022    PSA <0.10 10/08/2021    PSA <0.10 10/23/2019    PSA <0.10 04/15/2019    PSA <0.10 03/12/2019    PSA 6.9 (H) 10/16/2018         Assessment:     No diagnosis found.       Plan:    Reviewed and interpreted patient's PSA trend    Will continue myrbetriq, refills sent    Will erform PSA checks every 6 mo   RTC in 6 mo    31 minutes total spent on patient's care today; >50% time spent on counseling/coordination of care

## 2025-03-07 RX ORDER — RIVAROXABAN 20 MG/1
20 TABLET, FILM COATED ORAL
Qty: 90 TABLET | Refills: 3 | Status: SHIPPED | OUTPATIENT
Start: 2025-03-07

## 2025-03-07 NOTE — TELEPHONE ENCOUNTER
Spoke with patient and confirmed need for Xarelto refill.  Verified [facility] as pharmacy of choice and refill electronically submitted.

## 2025-03-10 DIAGNOSIS — E29.1 HYPOGONADISM MALE: ICD-10-CM

## 2025-03-11 RX ORDER — TESTOSTERONE 10 MG/.5G
4 GEL, METERED TOPICAL EVERY MORNING
Qty: 120 G | Refills: 3 | Status: SHIPPED | OUTPATIENT
Start: 2025-03-11

## 2025-03-16 ENCOUNTER — HOSPITAL ENCOUNTER (EMERGENCY)
Facility: HOSPITAL | Age: 80
Discharge: HOME | End: 2025-03-17
Payer: MEDICARE

## 2025-03-16 ENCOUNTER — TELEPHONE (OUTPATIENT)
Dept: OPHTHALMOLOGY | Facility: HOSPITAL | Age: 80
End: 2025-03-16
Payer: MEDICARE

## 2025-03-16 VITALS
TEMPERATURE: 97.5 F | HEART RATE: 52 BPM | BODY MASS INDEX: 25.92 KG/M2 | RESPIRATION RATE: 18 BRPM | WEIGHT: 175 LBS | OXYGEN SATURATION: 94 % | HEIGHT: 69 IN

## 2025-03-16 DIAGNOSIS — H20.9 IRITIS OF RIGHT EYE: Primary | ICD-10-CM

## 2025-03-16 PROCEDURE — 99284 EMERGENCY DEPT VISIT MOD MDM: CPT

## 2025-03-16 ASSESSMENT — PAIN - FUNCTIONAL ASSESSMENT: PAIN_FUNCTIONAL_ASSESSMENT: 0-10

## 2025-03-16 ASSESSMENT — PAIN SCALES - GENERAL: PAINLEVEL_OUTOF10: 5 - MODERATE PAIN

## 2025-03-16 ASSESSMENT — LIFESTYLE VARIABLES
HAVE PEOPLE ANNOYED YOU BY CRITICIZING YOUR DRINKING: NO
EVER HAD A DRINK FIRST THING IN THE MORNING TO STEADY YOUR NERVES TO GET RID OF A HANGOVER: NO
TOTAL SCORE: 0
HAVE YOU EVER FELT YOU SHOULD CUT DOWN ON YOUR DRINKING: NO
EVER FELT BAD OR GUILTY ABOUT YOUR DRINKING: NO

## 2025-03-16 ASSESSMENT — PAIN DESCRIPTION - PROGRESSION: CLINICAL_PROGRESSION: NOT CHANGED

## 2025-03-16 ASSESSMENT — PAIN DESCRIPTION - LOCATION: LOCATION: EYE

## 2025-03-17 ENCOUNTER — TELEMEDICINE (OUTPATIENT)
Dept: UROLOGY | Facility: HOSPITAL | Age: 80
End: 2025-03-17
Payer: MEDICARE

## 2025-03-17 DIAGNOSIS — C61 PROSTATE CANCER (MULTI): ICD-10-CM

## 2025-03-17 DIAGNOSIS — E29.1 HYPOGONADISM MALE: ICD-10-CM

## 2025-03-17 PROCEDURE — G2211 COMPLEX E/M VISIT ADD ON: HCPCS | Performed by: STUDENT IN AN ORGANIZED HEALTH CARE EDUCATION/TRAINING PROGRAM

## 2025-03-17 PROCEDURE — 2500000005 HC RX 250 GENERAL PHARMACY W/O HCPCS

## 2025-03-17 PROCEDURE — 99213 OFFICE O/P EST LOW 20 MIN: CPT | Performed by: STUDENT IN AN ORGANIZED HEALTH CARE EDUCATION/TRAINING PROGRAM

## 2025-03-17 RX ORDER — TETRACAINE HYDROCHLORIDE 5 MG/ML
1 SOLUTION OPHTHALMIC ONCE
Status: COMPLETED | OUTPATIENT
Start: 2025-03-17 | End: 2025-03-17

## 2025-03-17 RX ORDER — TESTOSTERONE 10 MG/.5G
4 GEL, METERED TOPICAL EVERY MORNING
Qty: 120 G | Refills: 3 | Status: SHIPPED | OUTPATIENT
Start: 2025-03-17

## 2025-03-17 RX ADMIN — TETRACAINE HYDROCHLORIDE 1 DROP: 5 SOLUTION OPHTHALMIC at 00:17

## 2025-03-17 RX ADMIN — FLUORESCEIN SODIUM 1 STRIP: 1 STRIP OPHTHALMIC at 00:17

## 2025-03-17 ASSESSMENT — VISUAL ACUITY
OD: 20/30
OS: 20/40
OU: 1
OU: 20/30

## 2025-03-17 NOTE — PROGRESS NOTES
UROLOGIC FOLLOW-UP VISIT     PROBLEM LIST:  1. Hypogonadism male  testosterone (Fortesta) 10 mg/0.5 gram /actuation gel in metered-dose pump gel      2. 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 Harris. Dr. Brumfield transferred care to me in December 2022. Patient was tried on myrbetriq and trospium for urgency sx. He has discontinued the trospium mainly due to dry mouth. He states he does feel improvement with the myrbetriq.     He also is on trt and has underwent an IPP placement. He had a PSA recurrence, however, his values have remained stable. He denies any current fevers, chills, nausea, vomiting, unintentional weight loss, increased fatigue or new onset bone pain.     3/18/25: Patient states he recently had cataract surgery and was in the ED for complications. Otherwise he feels he's doing well overall and no new complaints.     PAST MEDICAL HISTORY:  Past Medical History:   Diagnosis Date    Arrhythmia     Cataract     Disease of thyroid gland     Hyperlipidemia     Hypertension     Hypothyroidism     Pulmonary embolism        PAST SURGICAL HISTORY:  Past Surgical History:   Procedure Laterality Date    COLONOSCOPY  11/15/2017    Complete Colonoscopy    OTHER SURGICAL HISTORY  05/31/2017    Atrial Cardioversion    PROSTATECTOMY          ALLERGIES:   No Known Allergies     MEDICATIONS:     Current Outpatient Medications:     allopurinol (Zyloprim) 100 mg tablet, TAKE 1 TABLET DAILY, Disp: 90 tablet, Rfl: 3    alpha tocopherol (Vitamin E) 268 mg (400 unit) capsule, Take 1 capsule (400 Units) by mouth once daily., Disp: , Rfl:     cycloSPORINE (Restasis) 0.05 % ophthalmic emulsion, INSTILL 1 DROP IN BOTH EYES TWICE A DAY, Disp: 180 each, Rfl: 3    escitalopram (Lexapro) 10 mg tablet,  TAKE 1 TABLET DAILY, Disp: 90 tablet, Rfl: 3    levothyroxine (Synthroid, Levoxyl) 100 mcg tablet, TAKE 1 TABLET DAILY, Disp: 90 tablet, Rfl: 3    mirabegron (Myrbetriq) 50 mg tablet extended release 24 hr 24 hr tablet, Take 1 tablet (50 mg) by mouth once daily., Disp: 90 tablet, Rfl: 3    mirabegron (Myrbetriq) 50 mg tablet extended release 24 hr 24 hr tablet, TAKE 1 TABLET BY MOUTH EVERY DAY, Disp: 30 tablet, Rfl: 2    moxifloxacin (Vigamox) 0.5 % ophthalmic solution, Administer 1 drop into the left eye 4 times a day., Disp: 3 mL, Rfl: 0    multivitamin (Multiple Vitamins) tablet, Take 1 tablet by mouth once daily., Disp: , Rfl:     Myrbetriq 50 mg tablet extended release 24 hr 24 hr tablet, Take 1 tablet (50 mg) by mouth once daily., Disp: , Rfl:     pravastatin (Pravachol) 40 mg tablet, TAKE 1 TABLET DAILY AT BEDTIME, Disp: 90 tablet, Rfl: 3    prednisoLONE acetate (Pred-Forte) 1 % ophthalmic suspension, Administer 1 drop into the left eye 4 times a day., Disp: 5 mL, Rfl: 2    prednisoLONE acetate (Pred-Forte) 1 % ophthalmic suspension, Administer 1 drop into the left eye 4 times a day., Disp: 10 mL, Rfl: 1    testosterone (Fortesta) 10 mg/0.5 gram /actuation gel in metered-dose pump gel, Place 4 Pump on the skin once daily in the morning., Disp: 120 g, Rfl: 3    Xarelto 20 mg tablet, TAKE 1 TABLET DAILY WITH BREAKFAST, Disp: 90 tablet, Rfl: 3  No current facility-administered medications for this visit.      SOCIAL HISTORY:  Patient  reports that he quit smoking about 51 years ago. His smoking use included cigarettes. He started smoking about 54 years ago. He has a 2 pack-year smoking history. He has never used smokeless tobacco. He reports current alcohol use of about 7.0 standard drinks of alcohol per week. He reports that he does not use drugs.   Social History     Socioeconomic History    Marital status:      Spouse name: Not on file    Number of children: Not on file    Years of education: Not on  file    Highest education level: Not on file   Occupational History    Not on file   Tobacco Use    Smoking status: Former     Current packs/day: 0.00     Average packs/day: 0.5 packs/day for 4.1 years (2.0 ttl pk-yrs)     Types: Cigarettes     Start date: 1971     Quit date: 1974     Years since quittin.1    Smokeless tobacco: Never    Tobacco comments:     Off and on til 1980   Vaping Use    Vaping status: Never Used   Substance and Sexual Activity    Alcohol use: Yes     Alcohol/week: 7.0 standard drinks of alcohol     Types: 5 Glasses of wine, 2 Shots of liquor per week     Comment: 4-5 times week    Drug use: Never    Sexual activity: Yes     Partners: Female   Other Topics Concern    Not on file   Social History Narrative    Not on file     Social Drivers of Health     Financial Resource Strain: Not on file   Food Insecurity: Not on file   Transportation Needs: Not on file   Physical Activity: Sufficiently Active (2024)    Exercise Vital Sign     Days of Exercise per Week: 5 days     Minutes of Exercise per Session: 30 min   Stress: Not on file   Social Connections: Not on file   Intimate Partner Violence: Not on file   Housing Stability: Not on file       FAMILY HISTORY:  Family History   Problem Relation Name Age of Onset    Heart attack Mother Haylee     Hypertension Mother Haylee     Heart attack Father Arturo Philippe     Hypertension Father Arturo Philippe     Alzheimer's disease Sister Rosalia     Hypertension Sister Rosalia     Hypertension Brother Matthew        REVIEW OF SYSTEMS:   Constitutional: Negative for fever and chills. Denies anorexia, weight loss.  Eyes: Negative for visual disturbance.   Respiratory: Negative for shortness of breath.    Cardiovascular: Negative for chest pain.   Gastrointestinal: Negative for nausea and vomiting.   Genitourinary: See interval history above.  Skin: Negative for rash.   Neurological: Negative for dizziness and numbness.   Psychiatric/Behavioral:  Negative for confusion and decreased concentration.       LABORATORY REVIEW:     Lab Results   Component Value Date    BUN 13 02/19/2025    CREATININE 0.91 02/19/2025    EGFR 85 02/19/2025     02/19/2025    K 4.3 02/19/2025     02/19/2025    CO2 30 02/19/2025    CALCIUM 9.6 02/19/2025      Lab Results   Component Value Date    WBC 3.7 (L) 02/19/2025    RBC 5.25 02/19/2025    HGB 16.2 02/19/2025    HCT 48.7 02/19/2025    MCV 92.8 02/19/2025    MCH 30.9 02/19/2025    MCHC 33.3 02/19/2025    RDW 12.6 02/19/2025     02/19/2025    MPV 9.4 02/19/2025        Lab Results   Component Value Date    PSA 0.38 03/03/2025    PSA 0.37 02/19/2025    PSA 0.24 05/31/2024    PSA 0.23 02/26/2024    PSA 0.15 09/25/2023    PSA 0.15 03/01/2023    PSA 0.11 12/07/2022    PSA <0.10 10/08/2021    PSA <0.10 10/23/2019    PSA <0.10 04/15/2019    PSA <0.10 03/12/2019    PSA 6.9 (H) 10/16/2018         Assessment:     1. Hypogonadism male  testosterone (Fortesta) 10 mg/0.5 gram /actuation gel in metered-dose pump gel      2. Prostate cancer (Multi)  PSA    PSA          Plan:    Reviewed and interpreted patient's PSA trend    Refills on TRT sent today to [facility]   Will perform PSA checks every 6 mo   RTC in 6 mo    24 minutes total spent on patient's care today; >50% time spent on counseling/coordination of care

## 2025-03-17 NOTE — CONSULTS
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     Patient reports  he recently had cataract surgery in both eyes. Things were going well. He just finished tapering his drops in the right eye, last used prednisolone Friday evening. Then on Sunday, he woke up with his right eye feeling irritated. He tried to use his iVizia eye drops because he thought it was his dry eyes acting up. Then later in the day he noticed his eye was red too. He is light sensitive as well.   No changes in vision. No new flashes or floaters, no eye pain with movements, no diplopia. No sudden vision loss.     No issues in left eye. Left eye he is still using prednisolone BID and moxi BID.    \  Past Medical History  He has a past medical history of Arrhythmia, Cataract, Disease of thyroid gland, Hyperlipidemia, Hypertension, Hypothyroidism, and Pulmonary embolism.    Exam  Base Eye Exam       Visual Acuity (Snellen - Linear)         Right Left    Near sc 20/20 20/20              Tonometry (Tonopen, 2:39 AM)         Right Left    Pressure 9 11              Pupils         Dark Light Shape React APD    Right 3 1 Round Brisk None    Left 3 1 Round Brisk None              Visual Fields         Left Right     Full Full              Extraocular Movement         Right Left     Full Full              Neuro/Psych       Oriented x3: Yes              Dilation       Both eyes: 1% Tropic 2.5% Phen @ 12:40 AM                  Additional Tests       Color         Right Left    [place] 11/11 11/11                  Slit Lamp and Fundus Exam       External Exam         Right Left    External Normal Normal              Slit Lamp Exam         Right Left    Lids/Lashes Normal Normal    Conjunctiva/Sclera 1+ injection, 1mm hyperpigmentation inferonasally fornix conjunctiva White and  "quiet    Cornea clear Clear    Anterior Chamber deep, rare WBC Deep    Iris Round and reactive Round and reactive    Lens PCIOL PCIOL    Anterior Vitreous Normal Normal              Fundus Exam         Right Left    Disc Pink and sharp Pink and sharp    C/D Ratio 0.4 0.4    Macula Good foveal reflex Good foveal reflex    Vessels Crisp, no heme or exudates Crisp, no heme or exudates    Periphery Flat and attached no tears or breaks Flat and attached no tears or breaks                       Last Recorded Vitals  Pulse 52, temperature 36.4 °C (97.5 °F), resp. rate 18, height 1.753 m (5' 9\"), weight 79.4 kg (175 lb), SpO2 94%.         Assessment/Plan   #rebound iritis OD   #pseudophakia OD (s/p CEIOL 2/10/25 Felicita)   -exam reassuring with excellent nVAsc 20/20 OU,  IOP 9/11 pupils 3>1 ou no rapd, visual field (VF) FTCF, EOMs full OU, color vision 11/11 OU   -SLE notable for OD 1+ conj injection and rare WBC in anterior chamber (AC), posterior chamber intraocular lens (PCIOL) centered, no hypopyon. OS wnl   DFE c/d 0.4 OU, m/v/p wnl   -restart pred QID OD   -follow up with Dr. Mims 33-5 days, [facility]    #[phone]    Recommend follow-up with  [facility], within 3-5 days. Please call [phone] (EYES) to make appointment.    Thank you for the consult. Please contact the Ophthalmology service with further questions or concerns.          Katherin Cabrera MD  Ophthalmology, PGY-2    Ophthalmology Adult Pager - [phone]  Ophthalmology Pediatrics Pager (8am- 5pm) - [phone]    For adult follow-up appointments, call: [phone]  For pediatric follow-up appointments, call: [phone]      NOTE: This note is not finalized until attending reviews and signs.        "

## 2025-03-17 NOTE — ED TRIAGE NOTES
Enters ED reporting R-eye pain s/p cataract surgery who presents for ophthalmology consult. Baseline hx: HTN, hypothyroidism, prostate cancer, glaucoma, cataracts

## 2025-03-17 NOTE — TELEPHONE ENCOUNTER
Right eye CEIOL 2/10/25 with Dr Mims. Finished right eye taper. Woke up with discomfort in right eye today. Has dry eyes so used iVizia. 2-3 hours ago started feeling some pain in the right eye. 4/10 pain. Looks a little bit red. No drainage or discharge. No real vision changes. No photophobia. Discussed with patient I cannot diagnose over the phone, and would recommend coming to [facility] ED. Patient voiced understanding, will probably come in tonight.

## 2025-03-17 NOTE — ED PROVIDER NOTES
"HPI   Chief Complaint   Patient presents with    Eye Pain       Patient is an 79-year-old male with PMH of HTN, HLD, dry eyes, glaucoma, cataracts, hypothyroidism, PE, A-fib on Eliquis, prostate cancer, CHF presenting today for right eye pain.  Had cataract surgery to the right eye on 2/10 to the left eye on .  Has been doing well.  Woke up this morning with eye itching.  Use artificial tears which helped with the irritation.  Since then, irritation is gradually gotten worse.  States that he has no blurry vision but he states that there has been a \"kind of change in his vision\".  Denies photophobia.  Denies any discharge coming from his eyes but does note that his eye looks irritated.  Denies any swelling to his eye.  Called ophthalmology [facility] and told him to come in to be evaluated.              Patient History   Past Medical History:   Diagnosis Date    Arrhythmia     Cataract     Disease of thyroid gland     Hyperlipidemia     Hypertension     Hypothyroidism     Pulmonary embolism      Past Surgical History:   Procedure Laterality Date    COLONOSCOPY  11/15/2017    Complete Colonoscopy    OTHER SURGICAL HISTORY  2017    Atrial Cardioversion    PROSTATECTOMY       Family History   Problem Relation Name Age of Onset    Heart attack Mother Haylee     Hypertension Mother Haylee     Heart attack Father Arturo Philippe     Hypertension Father Arturo Philippe     Alzheimer's disease Sister Rosalia     Hypertension Sister Rosalia     Hypertension Brother Rodner      Social History     Tobacco Use    Smoking status: Former     Current packs/day: 0.00     Average packs/day: 0.5 packs/day for 4.1 years (2.0 ttl pk-yrs)     Types: Cigarettes     Start date: 1971     Quit date: 1974     Years since quittin.1    Smokeless tobacco: Never    Tobacco comments:     Off and on til 1980   Vaping Use    Vaping status: Never Used   Substance Use Topics    Alcohol use: Yes     Alcohol/week: 7.0 standard drinks of " alcohol     Types: 5 Glasses of wine, 2 Shots of liquor per week     Comment: 4-5 times week    Drug use: Never       Physical Exam   ED Triage Vitals [03/16/25 2347]   Temperature Heart Rate Respirations BP   36.4 °C (97.5 °F) 52 18 --      Pulse Ox Temp src Heart Rate Source Patient Position   94 % -- -- --      BP Location FiO2 (%)     -- --       Physical Exam  Constitutional:       Appearance: Normal appearance.   Eyes:      General: Lids are normal. Vision grossly intact.         Right eye: No foreign body or discharge.         Left eye: No foreign body or discharge.      Extraocular Movements: Extraocular movements intact.      Right eye: Normal extraocular motion.      Left eye: Normal extraocular motion.      Conjunctiva/sclera:      Right eye: Right conjunctiva is injected. No exudate or hemorrhage.     Left eye: Left conjunctiva is not injected. No exudate or hemorrhage.     Pupils: Pupils are equal, round, and reactive to light.   Pulmonary:      Effort: Pulmonary effort is normal.   Neurological:      General: No focal deficit present.      Mental Status: He is alert and oriented to person, place, and time.   Psychiatric:         Mood and Affect: Mood normal.         Behavior: Behavior normal.           ED Course & MDM   ED Course as of 03/17/25 0211   Mon Mar 17, 2025   0138   Visual Acuity  Bilateral Distance: 20/30 R Distance: 20/30 L Distance: 20/40     [ML]      ED Course User Index  [ML] Debbie Cameron PA-C         Diagnoses as of 03/17/25 0211   Iritis of right eye                 No data recorded     Eyad Coma Scale Score: 15 (03/17/25 0018 : Lay Taylor RN)                           Medical Decision Making  79 year old male presenting today for right eye irritation. Vision grossly intact. No trauma. No discharge. PERRLA bilat and EOM intact. Right eye injected. Ophthalmology consulted. See their note for details. Diagnosed with iritis of the right eye. Patient to start back his  prednisolone eye drops TID and follow up with his ophthalmologist. Already has drops and patient declined refill.        Procedure  Procedures     Debbie Cameron PA-C  03/17/25 0213

## 2025-03-19 NOTE — PROGRESS NOTES
3/20/2025 CC: 79 y.o. presents for POM1 s/p Phaco/PCIOL OS 2/24/2025    Past ocular history: glaucoma suspect, Pseudophakia OU  Family history: no family history of glaucoma   Past medical history: HTN, hypothyroidism, prostate cancer, others per chart   Social history: denies tobacco     Eye medications:   Both eyes: PF tid  AT PRN    Allergy:  NKDA    Testing:    OCT 1/6/2025:  OD- full, avg 93. OS- full, avg 88 um. GCA: full OU. Mac: Regular macular contour, /252    Pachymetry 1/6/2025:  484/504    Gonioscopy: open OU    Tmax: mid- teens    Assessment:   S/p Phaco/PCIOL OS 2/25/2025  -POD1: [facility] 20/20, IOP 18, Mild K edema   -POW1:[facility] 20/20, IOP 10. K edema resolved   -POM1:[facility] 20/20. IOP 12. Looks good    S/p Phaco/PCIOL OD 2/10/2025  -POD1: [facility] 20/30, IOP 16. Mild K edema.  -POW1:[facility] 20/25, IOP 10. K edema resolved.  -POW2: [facility] 20/20, IOP 13. K edema resolved  -POM1:[facility] 20/20, IOP 9. Looks good.  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 [facility] 20/25, IOP 11, rare AC cells. PF slow taper    Glaucoma suspect  - GENE 2/2022, Dr Joe  - Negative fhx  - Thinner C  - CDR 0.5  - Testing: wnl    [place]  - Using Restasis/Ats    Plan:   I explained my findings. PO instructions as provided.     Eye medications:   Both eyes: PF slow taper  Continue Ats PRN     Return in 1 mo, Mrx/OCT

## 2025-03-20 ENCOUNTER — OFFICE VISIT (OUTPATIENT)
Dept: OPHTHALMOLOGY | Facility: CLINIC | Age: 80
End: 2025-03-20
Payer: MEDICARE

## 2025-03-20 DIAGNOSIS — Z98.42 CATARACT EXTRACTION STATUS OF LEFT EYE: ICD-10-CM

## 2025-03-20 DIAGNOSIS — Z98.41 CATARACT EXTRACTION STATUS OF RIGHT EYE: Primary | ICD-10-CM

## 2025-03-20 PROCEDURE — 99024 POSTOP FOLLOW-UP VISIT: CPT | Performed by: OPHTHALMOLOGY

## 2025-03-20 RX ORDER — PREDNISOLONE ACETATE 10 MG/ML
1 SUSPENSION/ DROPS OPHTHALMIC 3 TIMES DAILY
Qty: 15 ML | Refills: 1 | Status: SHIPPED | OUTPATIENT
Start: 2025-03-20

## 2025-03-20 ASSESSMENT — EXTERNAL EXAM - RIGHT EYE: OD_EXAM: NORMAL

## 2025-03-20 ASSESSMENT — REFRACTION_WEARINGRX
OS_AXIS: 073
OS_SPHERE: +2.00
OS_ADD: +2.25
OD_ADD: +2.25
OD_AXIS: 115
OD_SPHERE: +2.00
SPECS_TYPE: PAL

## 2025-03-20 ASSESSMENT — ENCOUNTER SYMPTOMS
EYES NEGATIVE: 0
HEMATOLOGIC/LYMPHATIC NEGATIVE: 0
CONSTITUTIONAL NEGATIVE: 0
RESPIRATORY NEGATIVE: 0
GASTROINTESTINAL NEGATIVE: 0
MUSCULOSKELETAL NEGATIVE: 0
NEUROLOGICAL NEGATIVE: 0
ENDOCRINE NEGATIVE: 0
CARDIOVASCULAR NEGATIVE: 0
ALLERGIC/IMMUNOLOGIC NEGATIVE: 0
PSYCHIATRIC NEGATIVE: 0

## 2025-03-20 ASSESSMENT — VISUAL ACUITY
OS_PH_SC+: -2
OD_SC: 20/25
METHOD: SNELLEN - LINEAR
OS_SC: 20/20

## 2025-03-20 ASSESSMENT — EXTERNAL EXAM - LEFT EYE: OS_EXAM: NORMAL

## 2025-03-20 ASSESSMENT — CUP TO DISC RATIO
OD_RATIO: 0.4
OS_RATIO: 0.4

## 2025-03-20 ASSESSMENT — SLIT LAMP EXAM - LIDS
COMMENTS: NORMAL
COMMENTS: NORMAL

## 2025-03-20 ASSESSMENT — PACHYMETRY
OD_CT(UM): 484
OS_CT(UM): 504

## 2025-03-20 ASSESSMENT — TONOMETRY
OD_IOP_MMHG: 11
IOP_METHOD: TONOPEN
OS_IOP_MMHG: 12

## 2025-03-26 ENCOUNTER — OFFICE VISIT (OUTPATIENT)
Dept: CARDIOLOGY | Facility: CLINIC | Age: 80
End: 2025-03-26
Payer: MEDICARE

## 2025-03-26 VITALS
HEART RATE: 61 BPM | BODY MASS INDEX: 26.7 KG/M2 | WEIGHT: 180.25 LBS | HEIGHT: 69 IN | SYSTOLIC BLOOD PRESSURE: 122 MMHG | OXYGEN SATURATION: 97 % | DIASTOLIC BLOOD PRESSURE: 70 MMHG

## 2025-03-26 DIAGNOSIS — I48.0 PAROXYSMAL ATRIAL FIBRILLATION (MULTI): ICD-10-CM

## 2025-03-26 PROCEDURE — 1159F MED LIST DOCD IN RCRD: CPT | Performed by: INTERNAL MEDICINE

## 2025-03-26 PROCEDURE — 3078F DIAST BP <80 MM HG: CPT | Performed by: INTERNAL MEDICINE

## 2025-03-26 PROCEDURE — 99214 OFFICE O/P EST MOD 30 MIN: CPT | Performed by: INTERNAL MEDICINE

## 2025-03-26 PROCEDURE — 1126F AMNT PAIN NOTED NONE PRSNT: CPT | Performed by: INTERNAL MEDICINE

## 2025-03-26 PROCEDURE — 93005 ELECTROCARDIOGRAM TRACING: CPT | Performed by: INTERNAL MEDICINE

## 2025-03-26 PROCEDURE — 3074F SYST BP LT 130 MM HG: CPT | Performed by: INTERNAL MEDICINE

## 2025-03-26 ASSESSMENT — COLUMBIA-SUICIDE SEVERITY RATING SCALE - C-SSRS
2. HAVE YOU ACTUALLY HAD ANY THOUGHTS OF KILLING YOURSELF?: NO
6. HAVE YOU EVER DONE ANYTHING, STARTED TO DO ANYTHING, OR PREPARED TO DO ANYTHING TO END YOUR LIFE?: NO
1. IN THE PAST MONTH, HAVE YOU WISHED YOU WERE DEAD OR WISHED YOU COULD GO TO SLEEP AND NOT WAKE UP?: NO

## 2025-03-26 ASSESSMENT — CHA2DS2 SCORE
VASCULAR DISEASE: NO
SEX: MALE
DIABETES: NO
CHF OR LEFT VENTRICULAR DYSFUNCTION: NO
PRIOR STROKE OR TIA OR THROMBOEMBOLISM: NO
HYPERTENSION: YES
CHA2D2S VASC SCORE: 3
AGE IN YEARS: 75+

## 2025-03-26 ASSESSMENT — ENCOUNTER SYMPTOMS
OCCASIONAL FEELINGS OF UNSTEADINESS: 0
DEPRESSION: 0
LOSS OF SENSATION IN FEET: 0

## 2025-03-26 ASSESSMENT — PAIN SCALES - GENERAL: PAINLEVEL_OUTOF10: 0-NO PAIN

## 2025-03-26 NOTE — PATIENT INSTRUCTIONS
It was nice to see you today.    We discussed that you may be a candidate for OLSON-AF a trial sponsored by [employer] that usees wearable technology - Kardia, patch monitor and watch to capture atrial fibrillation and determine AF burden.    Katie will reach  out to you but we will need documentation of AF within the last year to qualify.    Continue your current medications.      Schedule 1 year follow up.

## 2025-03-26 NOTE — PROGRESS NOTES
Returns for follow up visit for    Chief Complaint   Patient presents with    Follow-up    Atrial Fibrillation    Palpitations     Patient returns to clinic with an appreciation of sporadic palpitations described as irregular heart beats lasting up to 60 minutes. He rarely experiences lightheadedness but denies syncope. He also denies dyspnea, orthopnea and chest pain/discomfort.  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 2009. © 2010 [employer] score of 3.     Past Medical History:  Past Medical History:   Diagnosis Date    Arrhythmia     Cataract     Disease of thyroid gland     Hyperlipidemia     Hypertension     Hypothyroidism     Pulmonary embolism        Past Surgical History:  Past Surgical History:   Procedure Laterality Date    COLONOSCOPY  11/15/2017    Complete Colonoscopy    OTHER SURGICAL HISTORY  05/31/2017    Atrial Cardioversion    PROSTATECTOMY            Social History:  Caffeine: 12 ounces latte  Cigarettes: none  ETOH: 3/week  Drugs: none  Exercise: 30 min/3 days week vigorously  Occ:physician  Marital status: lives alone     Family History:  Family History   Problem Relation Name Age of Onset    Heart attack Mother Haylee     Hypertension Mother Haylee     Heart attack Father Arturo Sr     Hypertension Father Arturo Sr     Alzheimer's disease Sister Rosalia     Hypertension Sister Rosalia     Hypertension Brother Matthew          Allergies:  No  "Known Allergies     Outpatient Medications:  Current Outpatient Medications   Medication Instructions    allopurinol (ZYLOPRIM) 100 mg, oral, Daily    alpha tocopherol (VITAMIN E) 400 Units, Daily RT    cycloSPORINE (Restasis) 0.05 % ophthalmic emulsion INSTILL 1 DROP IN BOTH EYES TWICE A DAY    escitalopram (LEXAPRO) 10 mg, oral, Daily    levothyroxine (SYNTHROID, LEVOXYL) 100 mcg, oral, Daily    mirabegron (MYRBETRIQ) 50 mg, oral, Daily    mirabegron (MYRBETRIQ) 50 mg, oral, Daily    moxifloxacin (Vigamox) 0.5 % ophthalmic solution 1 drop, Left Eye, 4 times daily    multivitamin (Multiple Vitamins) tablet 1 tablet, Daily    pravastatin (PRAVACHOL) 40 mg, oral, Nightly    prednisoLONE acetate (Pred-Forte) 1 % ophthalmic suspension 1 drop, Both Eyes, 3 times daily    testosterone (Fortesta) 10 mg/0.5 gram /actuation gel in metered-dose pump gel 4 Pump, transdermal, Every morning    Xarelto 20 mg, oral, Daily with breakfast         Last Recorded Vitals:      2/10/2025     9:02 AM 2/19/2025    10:37 AM 2/24/2025     8:00 AM 2/24/2025     9:39 AM 2/24/2025    10:12 AM 3/16/2025    11:47 PM 3/26/2025     2:39 PM   Vitals   Systolic 120 126 134 120 118  122   Diastolic 71 82 79 81 80  70   BP Location       Right arm   Heart Rate 50 52 55 51 50 52 61   Temp 36.7 °C (98.1 °F)  36.4 °C (97.5 °F) 36.4 °C (97.5 °F) 36.5 °C (97.7 °F) 36.4 °C (97.5 °F)    Resp 16  16 20 18 18    Height   1.753 m (5' 9\")   1.753 m (5' 9\") 1.753 m (5' 9\")   Weight (lb)  177 177   175 180.25   BMI  26.14 kg/m2 26.14 kg/m2   25.84 kg/m2 26.62 kg/m2   BSA (m2)  1.98 m2 1.98 m2   1.97 m2 2 m2   Visit Report  Report     Report        Physical Exam:  Physical Exam  Constitutional:       Appearance: Normal appearance. He is normal weight.   Neck:      Vascular: No carotid bruit.   Cardiovascular:      Rate and Rhythm: Normal rate and regular rhythm.      Chest Wall: PMI is not displaced.      Pulses: Normal pulses.      Heart sounds: S1 normal and S2 " normal. No murmur heard.     No S3 or S4 sounds.   Pulmonary:      Effort: Pulmonary effort is normal.      Breath sounds: Normal breath sounds.   Musculoskeletal:      Right lower leg: No edema.      Left lower leg: No edema.   Skin:     General: Skin is warm and dry.   Neurological:      Mental Status: He is alert and oriented to person, place, and time.   Psychiatric:         Mood and Affect: Mood normal.         Judgment: Judgment normal.          Last Cardiology Tests:  ECG:    SB 57 bpm LAD otherwise normal  Echo:    CONCLUSIONS:   1. Left ventricular systolic function is normal with a 60-65% estimated ejection fraction.   2. There is mild to moderate asymmetric left ventricular hypertrophy.   3. RVSP within normal limits.   4. Left ventricular cavity size is decreased.      Lab review: I have Chemistry CMP:   Lab Results   Component Value Date    ALBUMIN 4.2 02/19/2025    CALCIUM 9.6 02/19/2025    CO2 30 02/19/2025    CREATININE 0.91 02/19/2025    GLUCOSE 86 02/19/2025    BILITOT 0.9 02/19/2025    PROT 7.0 02/19/2025    ALT 16 02/19/2025    AST 26 02/19/2025    ALKPHOS 55 02/19/2025   , Chemistry BMP   Lab Results   Component Value Date    GLUCOSE 86 02/19/2025    CALCIUM 9.6 02/19/2025    CO2 30 02/19/2025    CREATININE 0.91 02/19/2025   , and CBC:  Lab Results   Component Value Date    WBC 3.7 (L) 02/19/2025    RBC 5.25 02/19/2025    HGB 16.2 02/19/2025    HCT 48.7 02/19/2025    MCV 92.8 02/19/2025    MCH 30.9 02/19/2025    MCHC 33.3 02/19/2025    RDW 12.6 02/19/2025    MPV 9.4 02/19/2025    NRBC 0.0 08/29/2024       Assessment/Plan   Problem List Items Addressed This Visit             ICD-10-CM    Paroxysmal atrial fibrillation (Multi) I48.0    Relevant Orders    ECG 12 lead (Clinic Performed)    ECG 12 lead     He is anticoagulated for stroke risk reduction.  His apple watch shows recurrent episodes.  We will try to get an ECG to document these are Afib events.  We discussed Swift[employer] trial.  Will ask Katie  to screen and reach out with information.    Fern Dent MD

## 2025-03-27 LAB
ATRIAL RATE: 57 BPM
P AXIS: 63 DEGREES
P OFFSET: 199 MS
P ONSET: 141 MS
PR INTERVAL: 170 MS
Q ONSET: 226 MS
QRS COUNT: 10 BEATS
QRS DURATION: 90 MS
QT INTERVAL: 434 MS
QTC CALCULATION(BAZETT): 422 MS
QTC FREDERICIA: 426 MS
R AXIS: -42 DEGREES
T AXIS: 16 DEGREES
T OFFSET: 443 MS
VENTRICULAR RATE: 57 BPM

## 2025-04-01 ENCOUNTER — APPOINTMENT (OUTPATIENT)
Dept: OPHTHALMOLOGY | Facility: CLINIC | Age: 80
End: 2025-04-01
Payer: MEDICARE

## 2025-04-07 DIAGNOSIS — E29.1 HYPOGONADISM MALE: ICD-10-CM

## 2025-04-07 DIAGNOSIS — E23.0 HYPOGONADOTROPIC HYPOGONADISM IN MALE (MULTI): ICD-10-CM

## 2025-04-09 RX ORDER — TESTOSTERONE 10 MG/.5G
4 GEL, METERED TOPICAL EVERY MORNING
Qty: 120 G | Refills: 3 | Status: SHIPPED | OUTPATIENT
Start: 2025-04-09

## 2025-04-11 ENCOUNTER — APPOINTMENT (OUTPATIENT)
Dept: CARDIOLOGY | Facility: HOSPITAL | Age: 80
End: 2025-04-11
Payer: MEDICARE

## 2025-04-21 NOTE — PROGRESS NOTES
4/22/2025 CC: 79 y.o. presents for FU, s/p Phaco/PCIOL OS 2/24/2025, Phaco/PCIOL OD 2/10/2025    Past ocular history: glaucoma suspect, Pseudophakia OU  Family history: no family history of glaucoma   Past medical history: HTN, hypothyroidism, prostate cancer, others per chart   Social history: denies tobacco     Eye medications:   Both eyes: AT PRN    Allergy:  NKDA    Testing:    OCT 1/6/2025:  OD- full, avg 93. OS- full, avg 88 um. GCA: full OU. Mac: Regular macular contour, /252    Pachymetry 1/6/2025:  484/504    Gonioscopy: open OU    Tmax: mid- teens    Assessment:   Pseudophakia OU  - S/p PCIOL OD 2/10/2025, OS 2/24/2025  - 3/20/2025: Rebound iritis OD noted 3/16/2025, restarted PF tid. [facility] 20/25, IOP 11, rare AC cells. PF slow taper  - 4/22/2025: [facility] 20/20, IOP 12 OU. AC quiet.     Glaucoma suspect  - GENE 2/2022, Dr Joe  - Negative fhx  - Thinner C  - CDR 0.5  - Testing: wnl    [place]  - Using Restasis/Ats    RE  - Near add. Progressive glasses was dispensed upon his request.  - Mrx 4/22/2025.    Plan:   I explained my findings. Pseudophakia OU, looks good. Mrx dispensed.     Eye medications:   Both eyes: Continue Ats PRN     Return in 1 mo, Mrx/OCT

## 2025-04-22 ENCOUNTER — APPOINTMENT (OUTPATIENT)
Dept: OPHTHALMOLOGY | Facility: CLINIC | Age: 80
End: 2025-04-22
Payer: MEDICARE

## 2025-04-22 DIAGNOSIS — Z98.42 CATARACT EXTRACTION STATUS OF LEFT EYE: Primary | ICD-10-CM

## 2025-04-22 DIAGNOSIS — Z98.41 CATARACT EXTRACTION STATUS OF RIGHT EYE: ICD-10-CM

## 2025-04-22 PROCEDURE — 99024 POSTOP FOLLOW-UP VISIT: CPT | Performed by: OPHTHALMOLOGY

## 2025-04-22 ASSESSMENT — PACHYMETRY
OD_CT(UM): 484
OS_CT(UM): 504

## 2025-04-22 ASSESSMENT — VISUAL ACUITY
METHOD: SNELLEN - LINEAR
OS_SC+: -2
OD_SC: 20/20
OS_SC: 20/20

## 2025-04-22 ASSESSMENT — ENCOUNTER SYMPTOMS
RESPIRATORY NEGATIVE: 0
EYES NEGATIVE: 1
ENDOCRINE NEGATIVE: 0
CONSTITUTIONAL NEGATIVE: 0
PSYCHIATRIC NEGATIVE: 0
NEUROLOGICAL NEGATIVE: 0
HEMATOLOGIC/LYMPHATIC NEGATIVE: 0
ALLERGIC/IMMUNOLOGIC NEGATIVE: 0
CARDIOVASCULAR NEGATIVE: 0
MUSCULOSKELETAL NEGATIVE: 0
GASTROINTESTINAL NEGATIVE: 0

## 2025-04-22 ASSESSMENT — REFRACTION_MANIFEST
OD_CYLINDER: +1.00
OD_AXIS: 168
OS_ADD: +2.75
OS_CYLINDER: -2.00
OD_SPHERE: +0.25
OS_AXIS: 085
OD_ADD: +2.75
OD_SPHERE: -0.50
OD_ADD: +2.50
OS_AXIS: 175
OD_AXIS: 078
OS_CYLINDER: +1.00
OS_SPHERE: -0.50
OS_ADD: +2.50
OS_SPHERE: -0.25
OD_CYLINDER: -2.50

## 2025-04-22 ASSESSMENT — CONF VISUAL FIELD
OD_SUPERIOR_TEMPORAL_RESTRICTION: 0
OD_INFERIOR_NASAL_RESTRICTION: 0
OD_INFERIOR_TEMPORAL_RESTRICTION: 0
OS_INFERIOR_NASAL_RESTRICTION: 0
OS_SUPERIOR_TEMPORAL_RESTRICTION: 0
OS_INFERIOR_TEMPORAL_RESTRICTION: 0
OS_NORMAL: 1
OD_NORMAL: 1
OS_SUPERIOR_NASAL_RESTRICTION: 0
OD_SUPERIOR_NASAL_RESTRICTION: 0

## 2025-04-22 ASSESSMENT — TONOMETRY
OS_IOP_MMHG: 12
OD_IOP_MMHG: 12
IOP_METHOD: TONOPEN

## 2025-04-22 ASSESSMENT — CUP TO DISC RATIO
OD_RATIO: 0.4
OS_RATIO: 0.4

## 2025-04-22 ASSESSMENT — SLIT LAMP EXAM - LIDS
COMMENTS: NORMAL
COMMENTS: NORMAL

## 2025-04-22 ASSESSMENT — EXTERNAL EXAM - LEFT EYE: OS_EXAM: NORMAL

## 2025-04-22 ASSESSMENT — EXTERNAL EXAM - RIGHT EYE: OD_EXAM: NORMAL

## 2025-04-27 DIAGNOSIS — R32 URINARY INCONTINENCE, UNSPECIFIED TYPE: ICD-10-CM

## 2025-05-05 RX ORDER — MIRABEGRON 50 MG/1
50 TABLET, FILM COATED, EXTENDED RELEASE ORAL DAILY
Qty: 90 TABLET | Refills: 1 | Status: SHIPPED | OUTPATIENT
Start: 2025-05-05

## 2025-05-07 DIAGNOSIS — R32 URINARY INCONTINENCE, UNSPECIFIED TYPE: Primary | ICD-10-CM

## 2025-05-09 RX ORDER — MIRABEGRON 50 MG/1
50 TABLET, FILM COATED, EXTENDED RELEASE ORAL DAILY
Qty: 90 TABLET | Refills: 3 | Status: SHIPPED | OUTPATIENT
Start: 2025-05-09

## 2025-06-05 DIAGNOSIS — H04.123 DRY EYES, BILATERAL: ICD-10-CM

## 2025-06-05 RX ORDER — CYCLOSPORINE 0.5 MG/ML
EMULSION OPHTHALMIC
Qty: 180 EACH | Refills: 3 | Status: SHIPPED | OUTPATIENT
Start: 2025-06-05

## 2025-06-11 ENCOUNTER — CLINICAL SUPPORT (OUTPATIENT)
Dept: AUDIOLOGY | Facility: CLINIC | Age: 80
End: 2025-06-11

## 2025-06-11 DIAGNOSIS — H90.3 BILATERAL SENSORINEURAL HEARING LOSS: Primary | ICD-10-CM

## 2025-06-11 PROCEDURE — V5299 HEARING SERVICE: HCPCS | Mod: AUDSP

## 2025-06-11 ASSESSMENT — PAIN - FUNCTIONAL ASSESSMENT: PAIN_FUNCTIONAL_ASSESSMENT: 0-10

## 2025-06-11 ASSESSMENT — PAIN SCALES - GENERAL: PAINLEVEL_OUTOF10: 0 - NO PAIN

## 2025-06-11 NOTE — PROGRESS NOTES
ADULT HEARING AID CHECK      Name:  Arturo Brumfield MD   :  1945   Age:  80 y.o.   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 He noted his wax traps were turned in the  and he couldn't change them. He also noted his hearing aids are not connecting to the lauren.       HEARING AIDS:  His hearing aids were cleaned and checked. His mics were brushed. His receivers were changed to 0M 5.0 receivers for ease of changing his wax traps. Listening check revealed the hearing aids were in good working condition.     I reviewed how to change cerustop wax traps and provided him spare supplies.     The lauren was deleted and re-downloaded. Hs devices were repaired to the lauren. His lauren is enable to track his HA locations.      Hearing Aid Information Right Left    Phonak Phonak   Model Audéo L70-R Audéo L70-R   Serial Number [id number]   /Tubing 0M 0M   Dome Small Vented Small Vented   Retention No No   Wax Traps Cerustop Cerustop      Repair Warranty 2027   Loss and Damage Warranty 2027   Fitting Date 2024    Fitting Audiologist Dion Berger CCC-A  Clinical Audiologist         Paired to Phone for phone calls: No  Paired to smart phone application: Yes  Gain Level: 100%  Volume controls active: Yes  Fit to Audiogram performed on 3/7/2024       IMPRESSIONS:  Continue full-time use of hearing aids. If he has any questions or concerns, return as needed for a hearing aid checks. It would be best to schedule hearing aid checks if needed. We cannot always accommodate walk-ins. He paid for his hearing aid check at check-out.       RECOMMENDATIONS:  1.) Return as needed for hearing aid clean and checks.  2.) Return for annual audiologic assessments as scheduled on 25.  3.) Continue full-time use of the hearing aids.       Dion Berger CCC-A  Clinical Audiologist

## 2025-07-02 ENCOUNTER — CLINICAL SUPPORT (OUTPATIENT)
Dept: AUDIOLOGY | Facility: CLINIC | Age: 80
End: 2025-07-02
Payer: MEDICARE

## 2025-07-02 DIAGNOSIS — H90.3 BILATERAL SENSORINEURAL HEARING LOSS: Primary | ICD-10-CM

## 2025-07-02 PROCEDURE — 92557 COMPREHENSIVE HEARING TEST: CPT

## 2025-07-02 PROCEDURE — 92567 TYMPANOMETRY: CPT

## 2025-07-02 ASSESSMENT — PAIN SCALES - GENERAL: PAINLEVEL_OUTOF10: 0 - NO PAIN

## 2025-07-02 ASSESSMENT — PAIN - FUNCTIONAL ASSESSMENT: PAIN_FUNCTIONAL_ASSESSMENT: 0-10

## 2025-07-02 NOTE — PROGRESS NOTES
AUDIOLOGIC EVALUATION      Name:  Arturo Brumfield MD  :  1945  Age:  80 y.o.  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 He reported that his hearing seems to have remained stable. He has a history of recreational noise exposure. He denied otalgia, otorrhea, tinnitus, dizziness, aural pressure/fullness, history of otologic surgeries, family history of hearing loss, and recent falls.       RESULTS:  Otoscopic Evaluation:  Right Ear: {Otoscopy:99360}  Left Ear: {Otoscopy:02760}    Immittance Measures:  Right Ear: {immittance:75691}  Left Ear: {immittance:25110}    Acoustic Reflexes:  Right Ear: {jeri ART:25893}  Left Ear: {jeri ART:04011}    Distortion Product Otoacoustic Emissions:   Right Ear: {jeri DPOAEs:66676}  Left Ear: {jeri DPOAEs:07157}    Pure Tone Audiometry:    Right Ear:   Left Ear:   Asymmetry: ***    In comparison to previous audio completed on ***,     Reliability:   {Good/Fair/Poor:19324}    Speech Audiometry:   Right: Speech Reception Threshold (SRT) was obtained at *** dBHL.   SRT/PTA in {Good/Fair/Poor:19649} agreement with pure tone average.    Left: Speech Reception Threshold (SRT) was obtained at *** dBHL.   SRT/PTA in {Good/Fair/Poor:62285} agreement with pure tone average.    Word Recognition Scores (WRS):  Right Ear: (E100-90, G88-78, F76-66, P64 less) {DESC; EXCELLENT/GOOD/FAIR:29516} (***%) in quiet when words were presented at *** dBHL.   Left Ear: {DESC; EXCELLENT/GOOD/FAIR:47081} (***%) in quiet when words were presented at *** dBHL.     Asymmetry: ***    Quick Speech in Noise (QuickSIN):  Right Ear: Unaided sound field signal-to-noise ratio (SNR) loss of *** which correlates to {QuickSIN:88194}  Left Ear: Unaided sound field SNR loss of *** which correlates to {QuickSIN:35129}  Binaural: Unaided sound field SNR loss of ***  which correlates to {QuickSIN:32168}      IMPRESSIONS:  The results were discussed with the patient.       RECOMMENDATIONS:  {Adult Recommendations:50593}      Dion Berger, CCC-A  Clinical Audiologist      KEY  SNHL Sensorineural Hearing Loss   CHL Conductive Hearing Loss   MHL Mixed Hearing Loss   SSNHL Sudden Sensorineural Hearing Loss   WNL Within Normal Limits   PTA Pure Tone Average   TM Tympanic Membrane   ECV Ear Canal Volume   SRT Speech Reception Threshold   WRS Word Recognition Score

## 2025-08-18 ASSESSMENT — ENCOUNTER SYMPTOMS
BACK PAIN: 0
FOCAL WEAKNESS: 0
LOSS OF BALANCE: 0
SHORTNESS OF BREATH: 0
DIAPHORESIS: 0
NEAR-SYNCOPE: 0
FATIGUE: 0
VISUAL CHANGE: 0
ABDOMINAL PAIN: 0
MEMORY LOSS: 1
FOCAL SENSORY LOSS: 0
SLURRED SPEECH: 0
NECK PAIN: 0
DIZZINESS: 0
ALTERED MENTAL STATUS: 0
AURA: 0
VERTIGO: 0
NEUROLOGIC COMPLAINT: 1
CONFUSION: 0
HEADACHES: 0
LIGHT-HEADEDNESS: 0
NAUSEA: 0
PALPITATIONS: 0
VOMITING: 0
CLUMSINESS: 0
FEVER: 0
BOWEL INCONTINENCE: 0
WEAKNESS: 0

## 2025-08-20 ENCOUNTER — APPOINTMENT (OUTPATIENT)
Dept: PRIMARY CARE | Facility: CLINIC | Age: 80
End: 2025-08-20
Payer: MEDICARE

## 2025-08-29 ENCOUNTER — OFFICE VISIT (OUTPATIENT)
Dept: URGENT CARE | Age: 80
End: 2025-08-29
Payer: MEDICARE

## 2025-08-29 VITALS
RESPIRATION RATE: 15 BRPM | SYSTOLIC BLOOD PRESSURE: 116 MMHG | TEMPERATURE: 97.7 F | DIASTOLIC BLOOD PRESSURE: 82 MMHG | HEART RATE: 68 BPM

## 2025-08-29 DIAGNOSIS — I48.92 ATRIAL FIB/FLUTTER, TRANSIENT (MULTI): Primary | ICD-10-CM

## 2025-08-29 DIAGNOSIS — I48.91 ATRIAL FIB/FLUTTER, TRANSIENT (MULTI): Primary | ICD-10-CM

## 2025-11-04 ENCOUNTER — APPOINTMENT (OUTPATIENT)
Dept: OPHTHALMOLOGY | Facility: CLINIC | Age: 80
End: 2025-11-04
Payer: MEDICARE

## 2025-11-20 ENCOUNTER — APPOINTMENT (OUTPATIENT)
Dept: UROLOGY | Facility: CLINIC | Age: 80
End: 2025-11-20
Payer: MEDICARE

## 2026-01-05 ENCOUNTER — APPOINTMENT (OUTPATIENT)
Dept: PRIMARY CARE | Facility: CLINIC | Age: 81
End: 2026-01-05
Payer: MEDICARE

## 2026-03-26 ENCOUNTER — APPOINTMENT (OUTPATIENT)
Dept: CARDIOLOGY | Facility: CLINIC | Age: 81
End: 2026-03-26
Payer: MEDICARE

## (undated) DEVICE — KNIFE, CLEARCUT SLIT, SIINGLE BEVEL, 2.4MM, ANG

## (undated) DEVICE — CANNULA, ANTERIOR CHAMBER, 27 GA

## (undated) DEVICE — SYRINGE, INSULIN, LUER LOCK, 1ML

## (undated) DEVICE — KNIFE, SIDEPORT, DUAL BEVEL, ANGLED, 1.0MM

## (undated) DEVICE — GOWN, ASTOUND, XL

## (undated) DEVICE — GLOVE, SURGICAL, PROTEXIS PI , 8.0, PF, LF

## (undated) DEVICE — DELIVERY SYSTEM, IOL, D CARTRIDGE, MONARCH III

## (undated) DEVICE — NEEDLE, FILTER 19 G X 1 IN

## (undated) DEVICE — HANDPIECE,  IRRIGATION/ASPIRATION, 45DEG, 2.2MM-2.8MM, BLUE

## (undated) DEVICE — DUOVISC .55

## (undated) DEVICE — Device

## (undated) DEVICE — NEEDLE, HYDRODISSECTION 25G 11MM BEND